# Patient Record
Sex: FEMALE | Race: WHITE | NOT HISPANIC OR LATINO
[De-identification: names, ages, dates, MRNs, and addresses within clinical notes are randomized per-mention and may not be internally consistent; named-entity substitution may affect disease eponyms.]

---

## 2023-05-26 PROBLEM — Z00.00 ENCOUNTER FOR PREVENTIVE HEALTH EXAMINATION: Status: ACTIVE | Noted: 2023-05-26

## 2023-06-30 ENCOUNTER — NON-APPOINTMENT (OUTPATIENT)
Age: 48
End: 2023-06-30

## 2023-07-06 ENCOUNTER — NON-APPOINTMENT (OUTPATIENT)
Age: 48
End: 2023-07-06

## 2023-07-07 ENCOUNTER — APPOINTMENT (OUTPATIENT)
Dept: GYNECOLOGIC ONCOLOGY | Facility: CLINIC | Age: 48
End: 2023-07-07
Payer: COMMERCIAL

## 2023-07-07 VITALS
HEART RATE: 77 BPM | DIASTOLIC BLOOD PRESSURE: 99 MMHG | RESPIRATION RATE: 18 BRPM | SYSTOLIC BLOOD PRESSURE: 129 MMHG | BODY MASS INDEX: 17.9 KG/M2 | WEIGHT: 125 LBS | HEIGHT: 70 IN | OXYGEN SATURATION: 99 %

## 2023-07-07 PROCEDURE — 99205 OFFICE O/P NEW HI 60 MIN: CPT

## 2023-07-07 NOTE — LETTER BODY
[Dear  ___] : Dear  [unfilled], [FreeTextEntry2] : \par Thank you for referring HUMPHREY PORTER to Gynecologic Oncology [facility]. I had the pleasure of meeting with Ms. HUMPHREY PORTER on 7/7/23. Please find my consultation note attached. Thank you for your trust and confidence in me and our practice, it means a great deal to us. Please feel free to contact me at anytime.\par Sincerely, \par \par Dr. Jeny Linares\par Office: [phone]\par Fax: [phone]\par

## 2023-07-07 NOTE — DISCUSSION/SUMMARY
[Reviewed Clinical Lab Test(s)] : Results of clinical tests were reviewed. [Discuss Alternatives/Risks/Benefits w/Patient] : All alternatives, risks, and benefits were discussed with the patient/family and all questions were answered.  Patient expressed good understanding and appreciates the importance of follow up as recommended. [Visit Time ___ Minutes] : [unfilled] minutes [Face to Face Time___ Minutes] : with [unfilled] minutes in face to face consultation. [FreeTextEntry1] : 47yo w/ long term persistent HPV infection and cervical dysplasia, recent pap with HPV16+ and colposcopy with CIN3, for excisional procedure.\par -I reviewed natural hx of HPV related cervical dysplasia and reviewed role of pap smear and colposcopy for surveillance of pre-cancer and cervix cancer. I reviewed CIN3 on colposcopy and next best step is excisional procedure to treat the dysplasia and rule out microinvasive cancer. Patient interested in definitive hysterectomy and wonders if she can just proceed with this and forego the cone biopsy.  Explained to her that given her long history of persistent dysplasia and recurrence of LAUREN-3 and HPV16+, it is possible that she may have a higher than 5% chance of invasive disease at the cervix and depending on extent of invasive disease simple hysterectomy may not suffice and she may need radical hysterectomy with lymph node dissection.  Therefore cone biopsy will provide all necessary information to determine the most appropriate neck step for definitive management. all questions answered and patient expressed understanding\par -Reviewed role of HPV vaccine at this juncture and given patient's persistent HPV infections and cervical dysplasia she is a candidate even though she is slightly over the age limit.  I explained to patient the benefit of post excision procedure vaccination and decreased risk of recurrent dysplasia when this protocol is followed.  I encouraged her  who was present to also get vaccinated and he is 44 years old, and I also encouraged them to vaccinate their children between age 9-11. all questions answered and patient expressed understanding\par -Surgical booking: EUA/CKC/ECC\par -ERAS, pre-op clearance at PST, labs, covid testing as pre protocol\par -risk, benefits reviewed with patient regarding above described procedure.  Reviewed risks of procedure not limited to infection, bleeding, injury to surrounding structures, VTE, heart and lung complications. all questions answered and patient expressed understanding\par -f/u post-op and plan HPV vaccine series at post-op visit\par

## 2023-07-07 NOTE — HISTORY OF PRESENT ILLNESS
[FreeTextEntry1] :  49yo   LMP , periods are now irregular aprox Q2 mths, referred for long history of abnormal paps and HPV 16+. Most recent annual exam with Dr erickson showed ASCUS pap and HPV 16+, colpo done and Bx showed CIN3. denies n/v/fever/bleeding/bloating. Reports normal urination and BMs. Pt has not received HPV vaccine\par \par PMHx: HTN\par PSHx: laparoscopy rsxn of endometriosis, LEEP, endoscopy\par OBGYNHx:  x2 hx of fibroids/cysts/endometriosis/abn paps/stis\par FamHx: maternal/paternal grandmothers with breast ca, denies gyn ca, colon ca\par SocHx: ETOH, marijuana use socially, denies cigarette smoking\par \par mammogram: WNL done within last year\par colonoscopy: WNL done \par \par Labs:\par Biopsy 23: \par 1.  Cervix, 6:00 biopsy: Unremarkable squamous mucosa\par 2.  Cervix, 9:00 biopsy: Cervical intraepithelial neoplasia (LAUREN) 3\par 3.  Cervix, 12:00 biopsy: Unremarkable squamous mucosa\par 4.  Endocervix, curettage: Unremarkable transformation zone epithelium\par \par pap 23: ASCUS, HPV 16+\par pap 11/10/23:  NILM HPV 16+\par LEEP 21: CIN3, ECC benign\par pap 5/3/23: ASCUS, HPV 16+\par pap 20: ASCUS, HPV 16+\par \par \par

## 2023-07-31 ENCOUNTER — APPOINTMENT (OUTPATIENT)
Dept: GYNECOLOGIC ONCOLOGY | Facility: CLINIC | Age: 48
End: 2023-07-31

## 2023-07-31 ENCOUNTER — RESULT REVIEW (OUTPATIENT)
Age: 48
End: 2023-07-31

## 2023-07-31 ENCOUNTER — TRANSCRIPTION ENCOUNTER (OUTPATIENT)
Age: 48
End: 2023-07-31

## 2023-08-02 ENCOUNTER — TRANSCRIPTION ENCOUNTER (OUTPATIENT)
Age: 48
End: 2023-08-02

## 2023-08-08 ENCOUNTER — APPOINTMENT (OUTPATIENT)
Dept: GYNECOLOGIC ONCOLOGY | Facility: CLINIC | Age: 48
End: 2023-08-08
Payer: COMMERCIAL

## 2023-08-08 VITALS
BODY MASS INDEX: 17.9 KG/M2 | SYSTOLIC BLOOD PRESSURE: 135 MMHG | HEIGHT: 70 IN | HEART RATE: 79 BPM | DIASTOLIC BLOOD PRESSURE: 77 MMHG | WEIGHT: 125 LBS

## 2023-08-08 PROCEDURE — 99213 OFFICE O/P EST LOW 20 MIN: CPT | Mod: 24

## 2023-08-08 NOTE — DISCUSSION/SUMMARY
[Reviewed Clinical Lab Test(s)] : Results of clinical tests were reviewed. [Discuss Alternatives/Risks/Benefits w/Patient] : All alternatives, risks, and benefits were discussed with the patient/family and all questions were answered.  Patient expressed good understanding and appreciates the importance of follow up as recommended. [Visit Time ___ Minutes] : [unfilled] minutes [Face to Face Time___ Minutes] : with [unfilled] minutes in face to face consultation. [FreeTextEntry1] : 47yo w/ CIN3 , HPV16+, s/p ckc/ecc, final path with LAUREN 1, neg margins, neg ECC. Now with post-op bleeding likely menstrual and small post-op bleed. NO active bleeding on exam today. For continued monitoring. -more than 50% of visit spent face to face with patient counseling and coordinating care -reviewed exam findings and only small blood on exam and suggestive of menstrual blood. monsels applied and surgicel left in place. bleeding precautions given. pt on strict pelvic rest -reviewed final path results and no further intervention needed other than continued pap smears and still recommend HPV 9 vaccine given hx of persistent HPV infection and patient just slightly over age limit. will coordinate vaccine inj series appointments. -rtc Friday as scheduled -pain/fever/bleeding precautions given

## 2023-08-08 NOTE — HISTORY OF PRESENT ILLNESS
[FreeTextEntry1] :  47yo with CIN3/HPV 16+ now s/p EUA/CKC/ECC on 7/31/23 now presents with c/o VB and passing large clot this morning. she states bleeding started about 4-5 days ago and has been intermittently heavy. she reports she is lizzy-menopausal but last menstrual flow was beginning of July. She admits she probably has been doing a little too much work around the house and is worried that caused the bleeding. she denies intercourse anything in vagina. she reports mild cramping pain. She denies fever/bloating. She has normal activity tolerance and normal appetite. Reports normal urination and BMs.   Pathology:  FINAL PATHOLOGIC DIAGNOSIS A. CERVIX, CONE, BIOPSY: - Koilocytic atypia consistent with HPV effect (LAUREN-I; LGSIL) in a background of chronic active cervicitis and reactive/metaplastic changes. - Negative for High grade dysplasia in the specimen examined (entire specimen and multiple levels examined). - See comment B. ENDOCERVIX CURETTINGS: - Polypoid fragments of endocervical mucosa; negative for dysplasia. - See comment Jennifer Sanchez 08/02/23 - [facility] Diagnosis Comment There has been intradepartmental review of this case with agreement. P16 immunohistochemical stain performed on block A3, A4 and B1 and showed focal patchy staining only.

## 2023-08-11 ENCOUNTER — APPOINTMENT (OUTPATIENT)
Dept: GYNECOLOGIC ONCOLOGY | Facility: CLINIC | Age: 48
End: 2023-08-11
Payer: COMMERCIAL

## 2023-08-11 VITALS — HEART RATE: 74 BPM | OXYGEN SATURATION: 97 % | DIASTOLIC BLOOD PRESSURE: 85 MMHG | SYSTOLIC BLOOD PRESSURE: 167 MMHG

## 2023-08-11 DIAGNOSIS — F41.1 GENERALIZED ANXIETY DISORDER: ICD-10-CM

## 2023-08-11 PROCEDURE — 99213 OFFICE O/P EST LOW 20 MIN: CPT | Mod: 24

## 2023-08-11 NOTE — DISCUSSION/SUMMARY
[Reviewed Clinical Lab Test(s)] : Results of clinical tests were reviewed. [Discuss Alternatives/Risks/Benefits w/Patient] : All alternatives, risks, and benefits were discussed with the patient/family and all questions were answered.  Patient expressed good understanding and appreciates the importance of follow up as recommended. [Visit Time ___ Minutes] : [unfilled] minutes [Face to Face Time___ Minutes] : with [unfilled] minutes in face to face consultation. [FreeTextEntry1] : 47yo w/ HRHPV+, CIN3 s/p ckc/ecc. Final path with CIN1. Post-op course c/b bleeding. Now improved after application of hemostatic agents. Will continue to monitor. -more than 50% of visit spent face to face with patient counseling and coordinating care -reviewed exam findings and reassured pt that bleeding should be resolved or much less. pt to continue pelvic rest and light activity for next 2wks -rx for xanax sent for anxiety as needed -pt to continue tylenol and motrin q6hrs for pain for the next 24-48hrs -if persistent pain then will order pelvic sono to rule out hematometra -f/u 2wks with AMERICA Carey for rpt exam -pain/fever/bleeding precautions given

## 2023-08-11 NOTE — HISTORY OF PRESENT ILLNESS
[FreeTextEntry1] : 47yo with CIN3/HPV 16+ now s/p EUA/CKC/ECC on 7/31/23 now presents for follow up and post op visit after being seen for c/o VB and passing large clot on Wednesday.  Since last visit patient reports continued heavy bleeding with bright red blood especially notices heavy flow when she goes to the bathroom. She also has intermittent pelvic cramping. She denies fever/bleeding/bloating. She has normal activity tolerance and continues pelvic rest and light activity. she has normal appetite. Reports normal urination and BMs. She has increased anxiety given post-op issues and current martial turmoil. she requests rx of xanax which she used to get from her PCP as needed for anxiety.

## 2023-08-15 ENCOUNTER — TRANSCRIPTION ENCOUNTER (OUTPATIENT)
Age: 48
End: 2023-08-15

## 2023-08-18 ENCOUNTER — NON-APPOINTMENT (OUTPATIENT)
Age: 48
End: 2023-08-18

## 2023-08-19 ENCOUNTER — NON-APPOINTMENT (OUTPATIENT)
Age: 48
End: 2023-08-19

## 2023-08-20 ENCOUNTER — NON-APPOINTMENT (OUTPATIENT)
Age: 48
End: 2023-08-20

## 2023-08-20 RX ORDER — TRAMADOL HYDROCHLORIDE 50 MG/1
50 TABLET, COATED ORAL EVERY 6 HOURS
Qty: 20 | Refills: 0 | Status: ACTIVE | COMMUNITY
Start: 2023-08-20 | End: 1900-01-01

## 2023-08-21 ENCOUNTER — RESULT REVIEW (OUTPATIENT)
Age: 48
End: 2023-08-21

## 2023-08-21 ENCOUNTER — APPOINTMENT (OUTPATIENT)
Dept: HEMATOLOGY ONCOLOGY | Facility: CLINIC | Age: 48
End: 2023-08-21
Payer: COMMERCIAL

## 2023-08-21 ENCOUNTER — INPATIENT (INPATIENT)
Facility: HOSPITAL | Age: 48
LOS: 1 days | Discharge: HOME | DRG: 921 | End: 2023-08-23
Attending: OBSTETRICS & GYNECOLOGY | Admitting: OBSTETRICS & GYNECOLOGY
Payer: COMMERCIAL

## 2023-08-21 ENCOUNTER — APPOINTMENT (OUTPATIENT)
Dept: GYNECOLOGIC ONCOLOGY | Facility: CLINIC | Age: 48
End: 2023-08-21
Payer: COMMERCIAL

## 2023-08-21 VITALS
OXYGEN SATURATION: 98 % | DIASTOLIC BLOOD PRESSURE: 87 MMHG | BODY MASS INDEX: 17.81 KG/M2 | HEART RATE: 87 BPM | TEMPERATURE: 98.4 F | HEIGHT: 70 IN | RESPIRATION RATE: 16 BRPM | WEIGHT: 124.38 LBS | SYSTOLIC BLOOD PRESSURE: 128 MMHG

## 2023-08-21 VITALS
DIASTOLIC BLOOD PRESSURE: 92 MMHG | OXYGEN SATURATION: 96 % | WEIGHT: 125 LBS | SYSTOLIC BLOOD PRESSURE: 154 MMHG | HEART RATE: 60 BPM | TEMPERATURE: 99 F | RESPIRATION RATE: 17 BRPM | HEIGHT: 70 IN

## 2023-08-21 VITALS — DIASTOLIC BLOOD PRESSURE: 87 MMHG | HEART RATE: 86 BPM | SYSTOLIC BLOOD PRESSURE: 126 MMHG
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
## 2023-08-21 DIAGNOSIS — N99.820 POSTPROCEDURAL HEMORRHAGE OF A GENITOURINARY SYSTEM ORGAN OR STRUCTURE FOLLOWING A GENITOURINARY SYSTEM PROCEDURE: ICD-10-CM

## 2023-08-21 DIAGNOSIS — Z80.3 FAMILY HISTORY OF MALIGNANT NEOPLASM OF BREAST: ICD-10-CM

## 2023-08-21 DIAGNOSIS — D69.9 HEMORRHAGIC CONDITION, UNSPECIFIED: ICD-10-CM

## 2023-08-21 DIAGNOSIS — D06.9 CARCINOMA IN SITU OF CERVIX, UNSPECIFIED: ICD-10-CM

## 2023-08-21 DIAGNOSIS — I10 ESSENTIAL (PRIMARY) HYPERTENSION: ICD-10-CM

## 2023-08-21 DIAGNOSIS — Z86.79 PERSONAL HISTORY OF OTHER DISEASES OF THE CIRCULATORY SYSTEM: ICD-10-CM

## 2023-08-21 DIAGNOSIS — F41.9 ANXIETY DISORDER, UNSPECIFIED: ICD-10-CM
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
## 2023-08-21 DIAGNOSIS — J45.909 UNSPECIFIED ASTHMA, UNCOMPLICATED: ICD-10-CM

## 2023-08-21 PROCEDURE — 99205 OFFICE O/P NEW HI 60 MIN: CPT | Mod: 25

## 2023-08-21 PROCEDURE — 99214 OFFICE O/P EST MOD 30 MIN: CPT

## 2023-08-21 PROCEDURE — 36415 COLL VENOUS BLD VENIPUNCTURE: CPT

## 2023-08-21 RX ORDER — HYDROMORPHONE HYDROCHLORIDE 2 MG/ML
0.5 INJECTION INTRAMUSCULAR; INTRAVENOUS; SUBCUTANEOUS ONCE
Refills: 0 | Status: DISCONTINUED | OUTPATIENT
Start: 2023-08-21 | End: 2023-08-21

## 2023-08-21 RX ORDER — TRAMADOL HYDROCHLORIDE 50 MG/1
50 TABLET, COATED ORAL EVERY 6 HOURS
Qty: 10 | Refills: 0 | Status: COMPLETED | COMMUNITY
Start: 2023-08-08 | End: 2023-08-21

## 2023-08-21 RX ORDER — ESCITALOPRAM OXALATE 20 MG/1
20 TABLET, FILM COATED ORAL
Refills: 0 | Status: ACTIVE | COMMUNITY

## 2023-08-21 RX ORDER — LISINOPRIL 10 MG/1
10 TABLET ORAL DAILY
Refills: 0 | Status: ACTIVE | COMMUNITY

## 2023-08-21 RX ADMIN — HYDROMORPHONE HYDROCHLORIDE 0.5 MILLIGRAM(S): 2 INJECTION INTRAMUSCULAR; INTRAVENOUS; SUBCUTANEOUS at 23:58

## 2023-08-21 RX ADMIN — HYDROMORPHONE HYDROCHLORIDE 0.5 MILLIGRAM(S): 2 INJECTION INTRAMUSCULAR; INTRAVENOUS; SUBCUTANEOUS at 23:43

## 2023-08-21 NOTE — HISTORY OF PRESENT ILLNESS
[de-identified] : Pt states her gyn, Dr. Dulce Maria Leal, referred her to Dr. Santy Delong, where comb biopsy performed  and bleeding still continues. States MD Santy Delong performed procedure to stop the bleeding 8/15/23 at [facility] and that did not work. 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 Has seen multiple mds and have had multiple tests and results are questionable.  States when having her last child in 2014, she hemorrhaged and almost needed a blood transfusion. States had several leep procedures performed with the same result of increased bleeding.  Menarche: 15 years old Menopause: perimenopause started within the last year G5,P2 (2 miscarriages and 1 ) Age at first live birth: 39 years old Nursed: formula fed Oral Contraceptives: none Hormone Replacement Therapy: none Last pap/pelvic exam: 2023 Related family history:  Paternal grandmother: breast cancer,  at age 79 Maternal grandmother: breast cancer,  at age 2022 Occupation:  Genetic testing: unsure Smoking: never ETOH: 2-3 times a week  Colonoscopy: last performed ; states test were normal Endoscopy: last performed ; states test show gastritis Mammo/US: last performed ; states test was normal Gyn: last visited 2023  DVTs: no hx Anticoagulant Hx: no hx Sleep Apnea: no hx  Recent Hospitalizations: 23 due to bleeding and was in the ER at [facility] Vaccinations: up to date

## 2023-08-21 NOTE — REASON FOR VISIT
After Visit Summary   10/30/2017    Lou Rivers    MRN: [id number]           Patient Information     Date Of Birth          1983        Visit Information        Provider Department      10/30/2017 1:00 PM Wen Laurent DO [facility]        Care Instructions                                                         If you have any questions regarding your visit, Please contact your care team.    Women s Health CLINIC HOURS TELEPHONE NUMBER   Wen DO Anastasiia.    AVELINA Hoover -    KENNETH Ngo RN       Monday, Wednesday, Thursday and [facility]  8:30a.m-5:00 p.m   [facility]  [address] [place] [address] [phone] ask for [facility]    Imaging [phone]       Urgent Care locations:    [facility] Saturday and Sunday   9 am - 5 pm    Monday-Friday   12 pm - 8 pm  Saturday and Sunday   9 am - 5 pm   [phone]     [facility] Labor and Delivery:  [phone]    If you need a medication refill, please contact your pharmacy. Please allow 3 business days for your refill to be completed.  As always, Thank you for trusting us with your healthcare needs!                Follow-ups after your visit        Your next 10 appointments already scheduled     Dec 01, 2017  4:30 PM CST   ESTABLISHED PRENATAL with Wen Laurent DO   [facility] ([facility])    [address]  [facility] [address] [phone]              Who to contact     If you have questions or need follow up information about today's clinic visit or your schedule please contact [facility] directly at [phone].  Normal or non-critical lab and imaging results will be communicated to you by MyChart, letter or phone within 4 business days after the clinic has received the results. If you do not hear  "from us within 7 days, please contact the clinic through [employer] or phone. If you have a critical or abnormal lab result, we will notify you by phone as soon as possible.  Submit refill requests through [employer] or call your pharmacy and they will forward the refill request to us. Please allow 3 business days for your refill to be completed.          Additional Information About Your Visit        [employer] Information     [employer] lets you send messages to your doctor, view your test results, renew your prescriptions, schedule appointments and more. To sign up, go to [email or web address] . Click on \"Log in\" on the left side of the screen, which will take you to the Welcome page. Then click on \"Sign up Now\" on the right side of the page.     You will be asked to enter the access code listed below, as well as some personal information. Please follow the directions to create your username and password.     Your access code is: [email or web address]  Expires: 10/31/2017 11:38 AM     Your access code will  in 90 days. If you need help or a new code, please call your [facility] clinic or [phone].        Care EveryWhere ID     This is your Care EveryWhere ID. This could be used by other organizations to access your [facility] medical records  [id number]        Your Vitals Were     Pulse Height Last Period Pulse Oximetry Breastfeeding? BMI (Body Mass Index)    101 1.588 m (5' 2.5\") 2017 97% No 25.85 kg/m2       Blood Pressure from Last 3 Encounters:   10/30/17 122/76   10/27/17 122/60   10/06/17 118/80    Weight from Last 3 Encounters:   10/30/17 65.1 kg (143 lb 9.6 oz)   10/27/17 64.8 kg (142 lb 14.4 oz)   17 63.6 kg (140 lb 3.2 oz)              Today, you had the following     No orders found for display       Primary Care Provider Office Phone # Fax #    [facility] [phone] [address]  [facility] [address]        Equal Access to Services     ALFONSO SHERMAN AH: " Hadii wiliam Gee, waedgardda luqadaha, qaybta kaaldong jama, nicole leni aliafatuma ochoacourtney nickjuancarlos lasammfatuma meg. So [facility] [phone].    ATENCIÓN: Si giovanala adriana, tiene a dahl disposición servicios gratuitos de asistencia lingüística. Llame al [phone].    We comply with applicable federal civil rights laws and [place] laws. We do not discriminate on the basis of race, color, national origin, age, disability, sex, sexual orientation, or gender identity.            Thank you!     Thank you for choosing [facility]  for your care. Our goal is always to provide you with excellent care. Hearing back from our patients is one way we can continue to improve our services. Please take a few minutes to complete the written survey that you may receive in the mail after your visit with us. Thank you!             Your Updated Medication List - Protect others around you: Learn how to safely use, store and throw away your medicines at www.disposemymeds.org.          This list is accurate as of: 10/30/17  1:16 PM.  Always use your most recent med list.                   Brand Name Dispense Instructions for use Diagnosis    fluticasone 50 MCG/ACT spray    FLONASE    1 Bottle    Spray 1 spray into both nostrils 2 times daily    Chronic allergic rhinitis due to pollen, unspecified seasonality       prenatal multivitamin plus iron 27-0.8 MG Tabs per tablet     100 tablet    Take 1 tablet by mouth daily    Family planning counseling          [FreeTextEntry1] : follow up

## 2023-08-21 NOTE — REVIEW OF SYSTEMS
[Night Sweats] : night sweats [Palpitations] : palpitations [Abdominal Pain] : abdominal pain [Dysmenorrhea/Abn Vaginal Bleeding] : dysmenorrhea/abnormal vaginal bleeding [Insomnia] : insomnia [Hot Flashes] : hot flashes [Easy Bleeding] : a tendency for easy bleeding [Easy Bruising] : a tendency for easy bruising [Negative] : Neurological [FreeTextEntry2] : several times a week [FreeTextEntry5] : several times a week either at rest or on exertion [FreeTextEntry7] : since surgery in July; takes tramadol and advil [FreeTextEntry8] : states hematuria since surgery in July

## 2023-08-21 NOTE — ASSESSMENT
[FreeTextEntry1] : Excessive bleeding Had cone biopsy 7/31/23 for CIN3 ongoing bleeding since. Occasionally passes clots. Reports it is getting worse Had LEEP in 2020 and had excess bleeding x 2 weeks.  No prior blood transfusion but was close to getting one during 1st pregnancy which was vaginal Hamstring surgery - no bleeding at that time Colposcopy, colonoscopy- no bleeding Played soccer, volleyball, basketball, tennis, track- would bleed very easily Never had heavy menses.  No nose bleeds, gum bleeds No family h/o bleeding bruising Drinks alcohol daily-> moreso lately due to her social issues Had seen hematologist in the past after her 2nd miscarriage (both in first trimester), also has 2 FTDs. 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  Referred to genetics.  She wants to hold off for now  Preop clearance Needs hysterectomy Clearance pending above blood work  Patient had multiple questions which were answered to satisfaction  Follow up in a couple of weeks No labs

## 2023-08-21 NOTE — PATIENT PROFILE ADULT - FALL HARM RISK - HARM RISK INTERVENTIONS

## 2023-08-21 NOTE — HISTORY OF PRESENT ILLNESS
[FreeTextEntry1] : 49yo with CIN3/HPV 16+ now s/p EUA/CKC/ECC on 7/31/23, returned to OR for cauterization of cervix on 8/15/23 now presents for follow up due to persistent bleeding over the weekend.  Since last visit patient reports having intermittent cramping and vaginal bleeding. States she stains pads and notices that bleeding will be light for several hours and then becomes heavy again. Passage of small clots but mostly bright red blood. She denies dizziness/sob/cp. Was seen by Dr Ibarra this morning for work up of bleeding disorder, initial labs (BC, PT/INR, PTT WNL). She denies fever/bloating. She has normal activity tolerance and normal appetite. Reports normal urination and BMs.

## 2023-08-21 NOTE — DISCUSSION/SUMMARY
[Reviewed Clinical Lab Test(s)] : Results of clinical tests were reviewed. [Discuss Tests w/Referring Providers] : Results of labs/radiology studies and the treatment recommendations were discussed with performing/referring physician. [Visit Time ___ Minutes] : [unfilled] minutes [FreeTextEntry1] : 49yo with CIN3/HPV 16+ now s/p EUA/CKC/ECC on 7/31/23, returned to OR for cauterization of cervix on 8/15/23 now presents for follow up due to persistent bleeding, speculum exam shows areas of bleeding at 12:00 and 7:00, unable to achieve consistent hemostasis with medications alone(nitrazine/Monsel's), so vagina packed with 4x4 gauze x2 tied together soaked in Monsel's and pt transported to ER for further management and IR consult for embolization. VSS and H/H wnl from this morning.  -more than 50% of visit spent face to face with patient counseling and coordinating care  -reviewed exam findings and advised pt that bleeding should be much less in order to be sent home and although she is stable at this time with VSS and stable H/H she will continue to bleed at home and needs more definitive management -transport to ER for further management with vaginal packing in situ and IR consult for embolization -will reach out to Dr Leal/Chapin for GYN coverage -f/u remaining labs for hematology workup D/W Dr Linares

## 2023-08-22 LAB
APPEARANCE UR: ABNORMAL
APTT BLD: 33.6 SEC — SIGNIFICANT CHANGE UP (ref 24.5–35.6)
BACTERIA # UR AUTO: PRESENT /HPF
BASOPHILS # BLD AUTO: 0.05 K/UL — SIGNIFICANT CHANGE UP (ref 0–0.2)
BASOPHILS NFR BLD AUTO: 0.5 % — SIGNIFICANT CHANGE UP (ref 0–2)
BILIRUB UR-MCNC: NEGATIVE — SIGNIFICANT CHANGE UP
BLD GP AB SCN SERPL QL: NEGATIVE — SIGNIFICANT CHANGE UP
COLOR SPEC: YELLOW — SIGNIFICANT CHANGE UP
COMMENT - URINE: SIGNIFICANT CHANGE UP
DIFF PNL FLD: ABNORMAL
EOSINOPHIL # BLD AUTO: 0.33 K/UL — SIGNIFICANT CHANGE UP (ref 0–0.5)
EOSINOPHIL NFR BLD AUTO: 3.5 % — SIGNIFICANT CHANGE UP (ref 0–6)
EPI CELLS # UR: ABNORMAL /HPF (ref 0–5)
FACT IX PPP CHRO-ACNC: 92 % — SIGNIFICANT CHANGE UP (ref 69–167)
FACT VIII ACT/NOR PPP: 95 % — SIGNIFICANT CHANGE UP (ref 51–138)
FIBRINOGEN PPP-MCNC: 280 MG/DL — SIGNIFICANT CHANGE UP (ref 200–445)
GLUCOSE UR QL: NEGATIVE — SIGNIFICANT CHANGE UP
HCG UR QL: NEGATIVE — SIGNIFICANT CHANGE UP
HCT VFR BLD CALC: 40.5 % — SIGNIFICANT CHANGE UP (ref 34.5–45)
HGB BLD-MCNC: 13.6 G/DL — SIGNIFICANT CHANGE UP (ref 11.5–15.5)
IMM GRANULOCYTES NFR BLD AUTO: 0.2 % — SIGNIFICANT CHANGE UP (ref 0–0.9)
INR BLD: 0.89 — SIGNIFICANT CHANGE UP (ref 0.85–1.18)
KETONES UR-MCNC: NEGATIVE — SIGNIFICANT CHANGE UP
LEUKOCYTE ESTERASE UR-ACNC: ABNORMAL
LYMPHOCYTES # BLD AUTO: 2.61 K/UL — SIGNIFICANT CHANGE UP (ref 1–3.3)
LYMPHOCYTES # BLD AUTO: 27.8 % — SIGNIFICANT CHANGE UP (ref 13–44)
MCHC RBC-ENTMCNC: 31.6 PG — SIGNIFICANT CHANGE UP (ref 27–34)
MCHC RBC-ENTMCNC: 33.6 GM/DL — SIGNIFICANT CHANGE UP (ref 32–36)
MCV RBC AUTO: 94.2 FL — SIGNIFICANT CHANGE UP (ref 80–100)
MONOCYTES # BLD AUTO: 0.67 K/UL — SIGNIFICANT CHANGE UP (ref 0–0.9)
MONOCYTES NFR BLD AUTO: 7.1 % — SIGNIFICANT CHANGE UP (ref 2–14)
NEUTROPHILS # BLD AUTO: 5.71 K/UL — SIGNIFICANT CHANGE UP (ref 1.8–7.4)
NEUTROPHILS NFR BLD AUTO: 60.9 % — SIGNIFICANT CHANGE UP (ref 43–77)
NITRITE UR-MCNC: NEGATIVE — SIGNIFICANT CHANGE UP
NRBC # BLD: 0 /100 WBCS — SIGNIFICANT CHANGE UP (ref 0–0)
PH UR: 7.5 — SIGNIFICANT CHANGE UP (ref 5–8)
PLATELET # BLD AUTO: 276 K/UL — SIGNIFICANT CHANGE UP (ref 150–400)
PROT UR-MCNC: 30 MG/DL
PROTHROM AB SERPL-ACNC: 10.2 SEC — SIGNIFICANT CHANGE UP (ref 9.5–13)
RBC # BLD: 3.96 M/UL — SIGNIFICANT CHANGE UP (ref 3.8–5.2)
RBC # BLD: 4.3 M/UL — SIGNIFICANT CHANGE UP (ref 3.8–5.2)
RBC # FLD: 13.4 % — SIGNIFICANT CHANGE UP (ref 10.3–14.5)
RBC CASTS # UR COMP ASSIST: ABNORMAL /HPF
RETICS #: 63.8 K/UL — SIGNIFICANT CHANGE UP (ref 25–125)
RETICS/RBC NFR: 1.6 % — SIGNIFICANT CHANGE UP (ref 0.5–2.5)
RH IG SCN BLD-IMP: POSITIVE — SIGNIFICANT CHANGE UP
RH IG SCN BLD-IMP: POSITIVE — SIGNIFICANT CHANGE UP
SP GR SPEC: 1.01 — SIGNIFICANT CHANGE UP (ref 1–1.03)
UROBILINOGEN FLD QL: 0.2 E.U./DL — SIGNIFICANT CHANGE UP
WBC # BLD: 9.39 K/UL — SIGNIFICANT CHANGE UP (ref 3.8–10.5)
WBC # FLD AUTO: 9.39 K/UL — SIGNIFICANT CHANGE UP (ref 3.8–10.5)
WBC UR QL: ABNORMAL /HPF

## 2023-08-22 PROCEDURE — 99221 1ST HOSP IP/OBS SF/LOW 40: CPT

## 2023-08-22 RX ORDER — OXYCODONE HYDROCHLORIDE 5 MG/1
5 TABLET ORAL EVERY 6 HOURS
Refills: 0 | Status: DISCONTINUED | OUTPATIENT
Start: 2023-08-22 | End: 2023-08-23

## 2023-08-22 RX ORDER — ALBUTEROL 90 UG/1
2 AEROSOL, METERED ORAL EVERY 6 HOURS
Refills: 0 | Status: DISCONTINUED | OUTPATIENT
Start: 2023-08-22 | End: 2023-08-23

## 2023-08-22 RX ORDER — PHENAZOPYRIDINE HCL 100 MG
200 TABLET ORAL EVERY 8 HOURS
Refills: 0 | Status: DISCONTINUED | OUTPATIENT
Start: 2023-08-22 | End: 2023-08-22

## 2023-08-22 RX ORDER — LISINOPRIL 2.5 MG/1
10 TABLET ORAL DAILY
Refills: 0 | Status: DISCONTINUED | OUTPATIENT
Start: 2023-08-22 | End: 2023-08-23

## 2023-08-22 RX ORDER — PHENAZOPYRIDINE HCL 100 MG
200 TABLET ORAL EVERY 8 HOURS
Refills: 0 | Status: DISCONTINUED | OUTPATIENT
Start: 2023-08-22 | End: 2023-08-23

## 2023-08-22 RX ORDER — ONDANSETRON 8 MG/1
8 TABLET, FILM COATED ORAL EVERY 8 HOURS
Refills: 0 | Status: DISCONTINUED | OUTPATIENT
Start: 2023-08-22 | End: 2023-08-23

## 2023-08-22 RX ORDER — OXYCODONE HYDROCHLORIDE 5 MG/1
5 TABLET ORAL ONCE
Refills: 0 | Status: DISCONTINUED | OUTPATIENT
Start: 2023-08-22 | End: 2023-08-22

## 2023-08-22 RX ORDER — ZOLPIDEM TARTRATE 10 MG/1
5 TABLET ORAL AT BEDTIME
Refills: 0 | Status: DISCONTINUED | OUTPATIENT
Start: 2023-08-22 | End: 2023-08-23

## 2023-08-22 RX ORDER — HYDROMORPHONE HYDROCHLORIDE 2 MG/ML
0.5 INJECTION INTRAMUSCULAR; INTRAVENOUS; SUBCUTANEOUS ONCE
Refills: 0 | Status: DISCONTINUED | OUTPATIENT
Start: 2023-08-22 | End: 2023-08-22

## 2023-08-22 RX ORDER — SODIUM CHLORIDE 9 MG/ML
1000 INJECTION, SOLUTION INTRAVENOUS
Refills: 0 | Status: DISCONTINUED | OUTPATIENT
Start: 2023-08-22 | End: 2023-08-22

## 2023-08-22 RX ORDER — ONDANSETRON 8 MG/1
10 TABLET, FILM COATED ORAL EVERY 6 HOURS
Refills: 0 | Status: DISCONTINUED | OUTPATIENT
Start: 2023-08-22 | End: 2023-08-22

## 2023-08-22 RX ORDER — LISINOPRIL 2.5 MG/1
10 TABLET ORAL EVERY 24 HOURS
Refills: 0 | Status: DISCONTINUED | OUTPATIENT
Start: 2023-08-22 | End: 2023-08-22

## 2023-08-22 RX ORDER — ACETAMINOPHEN 500 MG
1000 TABLET ORAL ONCE
Refills: 0 | Status: COMPLETED | OUTPATIENT
Start: 2023-08-22 | End: 2023-08-22

## 2023-08-22 RX ORDER — METOCLOPRAMIDE HCL 10 MG
10 TABLET ORAL EVERY 8 HOURS
Refills: 0 | Status: DISCONTINUED | OUTPATIENT
Start: 2023-08-22 | End: 2023-08-23

## 2023-08-22 RX ORDER — SODIUM CHLORIDE 9 MG/ML
1000 INJECTION, SOLUTION INTRAVENOUS
Refills: 0 | Status: DISCONTINUED | OUTPATIENT
Start: 2023-08-22 | End: 2023-08-23

## 2023-08-22 RX ORDER — TRANEXAMIC ACID 100 MG/ML
1000 INJECTION, SOLUTION INTRAVENOUS ONCE
Refills: 0 | Status: COMPLETED | OUTPATIENT
Start: 2023-08-22 | End: 2023-08-22

## 2023-08-22 RX ORDER — ACETAMINOPHEN 500 MG
1000 TABLET ORAL EVERY 6 HOURS
Refills: 0 | Status: DISCONTINUED | OUTPATIENT
Start: 2023-08-22 | End: 2023-08-23

## 2023-08-22 RX ORDER — ESCITALOPRAM OXALATE 10 MG/1
20 TABLET, FILM COATED ORAL DAILY
Refills: 0 | Status: DISCONTINUED | OUTPATIENT
Start: 2023-08-22 | End: 2023-08-23

## 2023-08-22 RX ORDER — METOCLOPRAMIDE HCL 10 MG
10 TABLET ORAL EVERY 6 HOURS
Refills: 0 | Status: DISCONTINUED | OUTPATIENT
Start: 2023-08-22 | End: 2023-08-22

## 2023-08-22 RX ADMIN — Medication 1000 MILLIGRAM(S): at 06:01

## 2023-08-22 RX ADMIN — HYDROMORPHONE HYDROCHLORIDE 0.5 MILLIGRAM(S): 2 INJECTION INTRAMUSCULAR; INTRAVENOUS; SUBCUTANEOUS at 02:22

## 2023-08-22 RX ADMIN — OXYCODONE HYDROCHLORIDE 5 MILLIGRAM(S): 5 TABLET ORAL at 14:22

## 2023-08-22 RX ADMIN — Medication 1000 MILLIGRAM(S): at 12:22

## 2023-08-22 RX ADMIN — Medication 200 MILLIGRAM(S): at 21:49

## 2023-08-22 RX ADMIN — LISINOPRIL 10 MILLIGRAM(S): 2.5 TABLET ORAL at 02:21

## 2023-08-22 RX ADMIN — Medication 400 MILLIGRAM(S): at 00:44

## 2023-08-22 RX ADMIN — LISINOPRIL 10 MILLIGRAM(S): 2.5 TABLET ORAL at 18:22

## 2023-08-22 RX ADMIN — TRANEXAMIC ACID 220 MILLIGRAM(S): 100 INJECTION, SOLUTION INTRAVENOUS at 13:15

## 2023-08-22 RX ADMIN — HYDROMORPHONE HYDROCHLORIDE 0.5 MILLIGRAM(S): 2 INJECTION INTRAMUSCULAR; INTRAVENOUS; SUBCUTANEOUS at 02:37

## 2023-08-22 RX ADMIN — Medication 1000 MILLIGRAM(S): at 23:01

## 2023-08-22 RX ADMIN — OXYCODONE HYDROCHLORIDE 5 MILLIGRAM(S): 5 TABLET ORAL at 13:22

## 2023-08-22 RX ADMIN — Medication 1000 MILLIGRAM(S): at 18:03

## 2023-08-22 RX ADMIN — ZOLPIDEM TARTRATE 5 MILLIGRAM(S): 10 TABLET ORAL at 23:01

## 2023-08-22 RX ADMIN — ESCITALOPRAM OXALATE 20 MILLIGRAM(S): 10 TABLET, FILM COATED ORAL at 12:22

## 2023-08-22 RX ADMIN — OXYCODONE HYDROCHLORIDE 5 MILLIGRAM(S): 5 TABLET ORAL at 04:59

## 2023-08-22 RX ADMIN — ONDANSETRON 8 MILLIGRAM(S): 8 TABLET, FILM COATED ORAL at 13:15

## 2023-08-22 RX ADMIN — SODIUM CHLORIDE 125 MILLILITER(S): 9 INJECTION, SOLUTION INTRAVENOUS at 00:44

## 2023-08-22 RX ADMIN — OXYCODONE HYDROCHLORIDE 5 MILLIGRAM(S): 5 TABLET ORAL at 05:59

## 2023-08-22 NOTE — CONSULT NOTE ADULT - SUBJECTIVE AND OBJECTIVE BOX
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 Patient reports prior to this procedure, she did not have any prior surgeries that required extensive blood transfusion. She reports after this procedure she had intermittent bleeding, prompting her to go to her OBGYN office on ,a nd required moncels and hemostatic agents. She reported that this was improved for a few days, though developed bleeding again on  and , and was taken back to OR for cauterization of cervix on 8/15.  Her last menstrual cycle she reports was in . Her prior bleeding after procedure was only about 1-2 per day and has been continuous until this weekend when it increased to a pad every hour. She was seen by her gyne onc Dr. Ibarra (hematologist)  and Dr. Delong and was sent to ED by gyn-onc PA. She underwent the start of a hematologic workup, though reports was unable to get some of her labwork. She was transferred to [facility] from [facility] ([facility] She reports the bleeding has since slowed down. She endorses fatigue and nausea, though denies lighteheadedness, shortness of breath, chest pain, vomiting, abdoiminal pain, or dysuria.     Of note, told once by a hematologist that she may have VWF deficiency in , when she was worked up after a miscarriage. She reports having 2 miscarriages both in the first trimester, and 2 normal spontaneous vaginal deliveries. Labs after postpartum hemorrhage revealed a VWF Factor 8 level low at 42, and fibrinogen low at 58. She reports she did have some bleeding 2014 after delivery of her last child and "almost required a transfusion." She also endorses some easy bruising, though no other epistaxis, rectal bleeding, or bleeding of any kind. She also reports several LEEP procedures performed with evidence of increased bleeding as well. Of note, patient reports that she has been driking more frequently ("at least 2 drinks per day")    Speculum exam done by OBGYN limited, but no overt signs of pooling or active bleeding. WBC 9.39, Hgb 13.6, Platelet 276    Seen by Dr. Brandon Ibarra outpatient ; Additional notes as follows    DVTs: no hx  Anticoagulant Hx: no hx  Sleep Apnea: no hx    Recent Hospitalizations: 23 due to bleeding and was in the ER at [facility]  Vaccinations: up to date    Menarche 15 years old  Menopause: perimenopause started within the last year   G2, P2 (2 miscarriages and 1 )  Age at first live birth: 39 years old   Nursed: formula fed  Oral Contraceptives: none   HRT: none   Last pap/pelvic exam   2023  Related family history   Paternal grandmother: breast cancer,  at age 79    SH     genetic testing: unsure   Smoking: never   ETOH: 2-3 times/week     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 Occasional passes of clots. Reports it is getting worse  Had LEEP in  and had excess bleeding x 2 weeks   No prior blood transfusion but was close to getting one during 1st pregnancy which was vaginal   Hamstring surgery - no bleeding at that time    Colposcopy, colonoscopy - no bleeding  Played soccer, volleyball, basketball, tennis, track- would bleed very easily   Never had heavy menses  No nose bleeds, gum bleeds  No familiy history of bleeding bruising   Drinks alcohol daily moreson lately due to her social issues    23 VWF activity 91  23 VWF multimer analysis normal  23 fibrinogen normal   Hgb 13.6 23 (has been 13-14 since July in our records, 1 value 2020 showed Hgb 13.7)  PT 10.2 (nml)  PTT 33.6 (nml)  INR 0.89  Rh positive   BUN normal   23  Iron Total, Serum 58  TIBC 366  Ferritin 90    PAST MEDICAL & SURGICAL HISTORY:      Allergies:  No Known Allergies      Medications:        Social History:    FAMILY HISTORY:      PHYSICAL EXAM:    T(F): 98 (23 @ 12:45), Max: 98.6 (23 @ 22:00)  HR: 74 (23 @ 12:45) (60 - 74)  BP: 152/84 (23 @ 12:45) (138/99 - 166/113)  RR: 19 (23 @ 12:45) (17 - 19)  SpO2: 97% (23 @ 12:45) (95% - 97%)  Wt(kg): --    Daily Height in cm: 177.8 (22 Aug 2023 02:54)    Daily Weight in k.7 (21 Aug 2023 22:00)    Gen: well developed, well nourished, comfortable  HEENT: normocephalic/atraumatic, no conjunctival pallor, no scleral icterus, no oral thrush/mucosal bleeding/mucositis  Neck: supple, no masses, no JVD  Breasts: deferred  Back: nontender  Cardiovascular: RR, nl S1S2, no murmurs/rubs/gallops  Respiratory: clear air entry b/l  Gastrointestinal: BS+, soft, NT/ND, no masses, no splenomegaly, no hepatomegaly, no evidence for ascites  Genitourinary: deferred  Rectal: deferred  Extremities: no clubbing/cyanosis, no edema, no calf tenderness  Vascular:  DP/PT 2+ b/l  Neurological: CN 2-12 grossly intact, no focal deficits  Skin: no rash on visible skin  Lymph Nodes:  no cervical/supraclavicular LAD, no axillary/groin LAD  Musculoskeletal:  full ROM  Psychiatric:  mood stable            Labs:                          13.6   9.39  )-----------( 276      ( 22 Aug 2023 00:39 )             40.5     CBC Full  -  ( 22 Aug 2023 06:29 )  WBC Count : x  RBC Count : 3.96 M/uL  Hemoglobin : x  Hematocrit : x  Platelet Count - Automated : x  Mean Cell Volume : x  Mean Cell Hemoglobin : x  Mean Cell Hemoglobin Concentration : x  Auto Neutrophil # : x  Auto Lymphocyte # : x  Auto Monocyte # : x  Auto Eosinophil # : x  Auto Basophil # : x  Auto Neutrophil % : x  Auto Lymphocyte % : x  Auto Monocyte % : x  Auto Eosinophil % : x  Auto Basophil % : x    PT/INR - ( 22 Aug 2023 00:39 )   PT: 10.2 sec;   INR: 0.89          PTT - ( 22 Aug 2023 00:39 )  PTT:33.6 sec                Other Labs:    Cultures:    Pathology:    Imaging Studies:       Hematology Oncology Consult Note     Sarah Beth Fulton is a 48 year old female who presents from OSH due to persistent vaginal bleeding after 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 Patient reports prior to this procedure, she did not have any prior surgeries that required extensive blood transfusion. She reports after this procedure she had intermittent bleeding, prompting her to go to her OBGYN office on , and required moncels and hemostatic agents. She reported that this was improved for a few days, though developed bleeding again on  and , and was taken back to OR for cauterization of cervix on 8/15.  She reports prior bleeding after procedure was only about 1-2 per day and has been continuous until this weekend when it increased to a pad every hour. She was seen by  Dr. Ibarra (hematologist)  and Dr. Delong (gyn onc) and was sent to ED by gyn-onc PA. She underwent the start of a hematologic workup in heme/onc outpatient office, though reports was unable to get some of her labwork. She presentd to OSH ([facility]), and transferred to [facility]. She reports the bleeding has since slowed down. She endorses fatigue and nausea, though denies lightheadedness, shortness of breath, chest pain, vomiting, abdominal pain, or dysuria.     Of note, patient was once told by a hematologist that she may have VWF deficiency in , when she was worked up after a miscarriage. She reports having 2 miscarriages both in the first trimester, and 2 normal spontaneous vaginal deliveries. Labs after postpartum hemorrhage revealed a VWF Factor 8 level low at 42 (was told lowerered by O blood type), and fibrinogen low at 58. She reports she did have some bleeding 2014 after delivery of her last child and "almost required a transfusion." She also endorses some easy bruising, though no other epistaxis, rectal bleeding, or bleeding of any kind. She also reports several LEEP procedures performed with evidence of increased bleeding as well. Of note, patient reports that she has been drinking more frequently ("at least 2 drinks per day")    Speculum exam done by OBGYN limited, but no overt signs of pooling or active bleeding. WBC 9.39, Hgb 13.6, Platelet 276    Seen by Dr. Brandon Ibarra outpatient ; Additional notes as follows    23 VWF activity 91  23 VWF multimer analysis normal  23 fibrinogen normal   Hgb 13.6 23 (has been 13-14 since July in our records, 1 value 2020 showed Hgb 13.7)  PT 10.2 (nml)  PTT 33.6 (nml)  INR 0.89  Rh positive   BUN normal   23  Iron Total, Serum 58  TIBC 366  Ferritin 90    DVTs: no hx  Anticoagulant Hx: no hx  Sleep Apnea: no hx    Recent Hospitalizations: 23 due to bleeding and was in the ER at [facility]  Vaccinations: up to date    Menarche 15 years old  Menopause: perimenopause started within the last year   G2, P2 (2 miscarriages and 1 )  Age at first live birth: 39 years old   Nursed: formula fed  Oral Contraceptives: none   HRT: none   Last pap/pelvic exam   2023  Related family history   Paternal grandmother: breast cancer,  at age 79    SH     genetic testing: unsure   Smoking: never   ETOH: 2-3 times/week     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 Occasional passes of clots. Reports it is getting worse  Had LEEP in  and had excess bleeding x 2 weeks   No prior blood transfusion but was close to getting one during 1st pregnancy which was vaginal   Hamstring surgery - no bleeding at that time    Colposcopy, colonoscopy - no bleeding  Played soccer, volleyball, basketball, tennis, track- would bleed very easily   Never had heavy menses  No nose bleeds, gum bleeds  No familiy history of bleeding bruising   Drinks alcohol daily moreson lately due to her social issues        PAST MEDICAL & SURGICAL HISTORY:      Allergies:  No Known Allergies      Medications:        Social History:    FAMILY HISTORY:      PHYSICAL EXAM:    T(F): 98 (23 @ 12:45), Max: 98.6 (23 @ 22:00)  HR: 74 (23 @ 12:45) (60 - 74)  BP: 152/84 (23 @ 12:45) (138/99 - 166/113)  RR: 19 (23 @ 12:45) (17 - 19)  SpO2: 97% (23 @ 12:45) (95% - 97%)  Wt(kg): --    Daily Height in cm: 177.8 (22 Aug 2023 02:54)    Daily Weight in k.7 (21 Aug 2023 22:00)    Gen: well developed, well nourished, comfortable  HEENT: normocephalic/atraumatic, no conjunctival pallor, no scleral icterus, no oral thrush/mucosal bleeding/mucositis  Neck: supple, no masses, no JVD  Cardiovascular: RR, nl S1S2, no murmurs/rubs/gallops  Respiratory: clear air entry b/l  Gastrointestinal: BS+, soft, NT/ND, no masses, no splenomegaly, no hepatomegaly, no evidence for ascites  Genitourinary: deferred  Rectal: deferred  Extremities: no clubbing/cyanosis, no edema, no calf tenderness  Neurological: CN 2-12 grossly intact, no focal deficits  Skin: no rash on visible skin  Lymph Nodes:  no cervical/supraclavicular LAD, no axillary/groin LAD  Musculoskeletal:  full ROM  Psychiatric:  mood stable            Labs:                          13.6   9.39  )-----------( 276      ( 22 Aug 2023 00:39 )             40.5     CBC Full  -  ( 22 Aug 2023 06:29 )  WBC Count : x  RBC Count : 3.96 M/uL  Hemoglobin : x  Hematocrit : x  Platelet Count - Automated : x  Mean Cell Volume : x  Mean Cell Hemoglobin : x  Mean Cell Hemoglobin Concentration : x  Auto Neutrophil # : x  Auto Lymphocyte # : x  Auto Monocyte # : x  Auto Eosinophil # : x  Auto Basophil # : x  Auto Neutrophil % : x  Auto Lymphocyte % : x  Auto Monocyte % : x  Auto Eosinophil % : x  Auto Basophil % : x    PT/INR - ( 22 Aug 2023 00:39 )   PT: 10.2 sec;   INR: 0.89          PTT - ( 22 Aug 2023 00:39 )  PTT:33.6 sec

## 2023-08-22 NOTE — H&P ADULT - HISTORY OF PRESENT ILLNESS
48 y.o.  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 Pt reports she initially had tihs procedure and then had intermittent bleeding after the procedure. She states the bleeding intensified and the pt required moncels and hemostatic agents in the office on . She then did not bleed for two days but began bleeding again on . She told her provider the bleeding was intensifying and she was taken back to the OR for cauterization of cervix on 8/15. She now presents once again due to persistent bleeding. She returned to he office on  and again had required moncels and bleeding attempted to be controlled but pt was only hemostatic for a few days until she began bleeding heavily. She states the day before today she saturated one pad per hour which is the most she has bled. Today. She saw her GYN ONC Dr. Santy Sanchez and Dr. Ibarra her hematologist. She was advised to go to the ED for evaluation of the bleeding by her gyn-onc PA and the hematologist began to work her up for a possible bleeding disorder. The pt has suspected she may have a bleeding disorder seeing as she was once told by a hematologist she may have VWF Deficiency in  when she was worked up after a miscarriage but the diagnosis was never confirmed. Currently, the pt was packed with 4x4s at the OSH and transferred to [facility]. The pt states the bleeding has slowed down since. She denies feeling, lightheaded, dizzy, sob, palpitations, nausea, vomiting, abdominal pain, diarrhea, dysuria.       OB/GYN Hx  G1:  MAB Med  G2:  VTOP D&C  G3:   SAB  G4:    G5:    PMHx: HTN, Asthma, Anxiety  SHx: L/ision, LEEP  (excessive bleeding for two weeks)  Meds: Lisinopril 10 QD, Lexapro 20 QD, Escitalopram 20 QD, Xanax .5 PRN, Ambien 10 PRN  Allergies: penicillin (unknown childhood reaction)  PHYSICAL EXAM:   Vital Signs Last 24 Hrs  T(C): 36.4 (22 Aug 2023 01:56), Max: 37 (21 Aug 2023 22:00)  T(F): 97.5 (22 Aug 2023 01:56), Max: 98.6 (21 Aug 2023 22:00)  HR: 74 (22 Aug 2023 01:56) (60 - 74)  BP: 154/103 (22 Aug 2023 01:56) (143/91 - 154/103)  BP(mean): --  RR: 17 (22 Aug 2023 01:56) (17 - 18)  SpO2: 96% (22 Aug 2023 01:56) (95% - 96%)    Parameters below as of 22 Aug 2023 01:56  Patient On (Oxygen Delivery Method): room air        **************************  Constitutional: Alert & Oriented x3, No acute distress  Respiratory: Clear to ausculation bilaterally; rhonchi, or crackles, minimal wheezing in b/l lung fields  Cardiovascular: regular rate and rhythm, no murmurs, or gallops  Gastrointestinal: soft, non tender, positive bowel sounds, no rebound or guarding   Pelvic exam: pending  Extremities: no calf tenderness or swelling      LABS:                        13.6   9.39  )-----------( 276      ( 22 Aug 2023 00:39 )             40.5           PT/INR - ( 22 Aug 2023 00:39 )   PT: 10.2 sec;   INR: 0.89          PTT - ( 22 Aug 2023 00:39 )  PTT:33.6 sec        RADIOLOGY & ADDITIONAL STUDIES:   48 y.o.  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 Pt reports she initially had tihs procedure and then had intermittent bleeding after the procedure. She states the bleeding intensified and the pt required moncels and hemostatic agents in the office on . She then did not bleed for two days but began bleeding again on . She told her provider the bleeding was intensifying and she was taken back to the OR for cauterization of cervix on 8/15. She now presents once again due to persistent bleeding. She returned to he office on  and again had required moncels and bleeding attempted to be controlled but pt was only hemostatic for a few days until she began bleeding heavily. She states the day before today she saturated one pad per hour which is the most she has bled. Today. She saw her GYN ONC Dr. Santy Sanchez and Dr. Ibarra her hematologist. She was advised to go to the ED for evaluation of the bleeding by her gyn-onc PA and the hematologist began to work her up for a possible bleeding disorder. The pt has suspected she may have a bleeding disorder seeing as she was once told by a hematologist she may have VWF Deficiency in  when she was worked up after a miscarriage but the diagnosis was never confirmed. Currently, the pt was packed with 4x4s at the OSH and transferred to [facility]. The pt states the bleeding has slowed down since. She denies feeling, lightheaded, dizzy, sob, palpitations, nausea, vomiting, abdominal pain, diarrhea, dysuria.       OB/GYN Hx  G1:  MAB Med  G2:  VTOP D&C  G3:   SAB  G4:    G5:    PMHx: HTN, Asthma, Anxiety  SHx: L/ision, LEEP  (excessive bleeding for two weeks)  Meds: Lisinopril 10 QD, Lexapro 20 QD, Xanax .5 PRN, Ambien 10 PRN, Albuterol PRN  Allergies: penicillin (unknown childhood reaction)  PHYSICAL EXAM:   Vital Signs Last 24 Hrs  T(C): 36.4 (22 Aug 2023 01:56), Max: 37 (21 Aug 2023 22:00)  T(F): 97.5 (22 Aug 2023 01:56), Max: 98.6 (21 Aug 2023 22:00)  HR: 74 (22 Aug 2023 01:56) (60 - 74)  BP: 154/103 (22 Aug 2023 01:56) (143/91 - 154/103)  BP(mean): --  RR: 17 (22 Aug 2023 01:56) (17 - 18)  SpO2: 96% (22 Aug 2023 01:56) (95% - 96%)    Parameters below as of 22 Aug 2023 01:56  Patient On (Oxygen Delivery Method): room air        **************************  Constitutional: Alert & Oriented x3, No acute distress  Respiratory: Clear to ausculation bilaterally; rhonchi, or crackles, minimal wheezing in b/l lung fields  Cardiovascular: regular rate and rhythm, no murmurs, or gallops  Gastrointestinal: soft, non tender, positive bowel sounds, no rebound or guarding   Pelvic exam: exam very limited secondary to pts inability to tolerate, cervix/posterior vagina with indistinct areas of bright red on speculum. no evidence of any pooling of blood or significant active bleeding, Moncels and surgicel powder applied on cervix and posterior vagina; vagina packed with 1/2 roll of vaginal packing soaked in lubricant; shaw catheter placed.   Extremities: no calf tenderness or swelling      LABS:                        13.6   9.39  )-----------( 276      ( 22 Aug 2023 00:39 )             40.5           PT/INR - ( 22 Aug 2023 00:39 )   PT: 10.2 sec;   INR: 0.89          PTT - ( 22 Aug 2023 00:39 )  PTT:33.6 sec        RADIOLOGY & ADDITIONAL STUDIES:

## 2023-08-22 NOTE — H&P ADULT - ASSESSMENT
48 y.o.  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  - Normal VS, Hgb 13.6, pt has no symptoms of anemia and is clinically and hemodynamically stable  - Bleeding is controlled at this time given there is no bright red blood on speculum exam or digital exam, packing is in situ  - Consult Hematology  - Consider reop for EUA and further cauterization on   - NPO and IVF    D/w Dr. Mcrae and Dr. Benton 48 y.o.  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  - Normal VS, Hgb 13.6, pt has no symptoms of anemia and is clinically and hemodynamically stable  - Given that speculum exam was very limited secondary to pts inability to tolerate, overall exam is still reassuring given no evidence of any pooling of blood or significant active bleeding. Moncels and surgicel powder were applied on cervix and posterior vagina; vagina packed with 1/2 roll of vaginal packing soaked in lubricant; shaw catheter placed.   - Consult Hematology  - Consider reop for EUA and further cauterization on   - NPO and IVF    D/w Dr. Mcrae and Dr. Lawson 48 y.o.  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  - Normal VS, Hgb 13.6, pt has no symptoms of anemia and is clinically and hemodynamically stable  - Given that speculum exam was very limited secondary to pts inability to tolerate, overall exam is still reassuring given no evidence of any pooling of blood or significant active bleeding. Moncels and surgicel powder were applied on cervix and posterior vagina; vagina packed with 1/2 roll of vaginal packing soaked in lubricant; shaw catheter placed.   - Consult Hematology  - Consider reop for EUA on   - NPO and IVF    D/w Dr. Mcrae and Dr. Lawson

## 2023-08-22 NOTE — CHART NOTE - NSCHARTNOTEFT_GEN_A_CORE
Hematology Oncology Chart Note (Full Consult Note to Follow in AM)    Information obtained from discussion with primary OBGYN team and chart review in [facility].    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 Post-op course notable for return to OR for cauterization of cervix on 8/15/23 now presents for follow up due to persistent bleeding over the weekend.    Patient has reportedly seen multiple hematologists over the past few years. Evaluated by [facility] Hematologist Brandon Ibarra on 8/21/23.  "It has been questioned over the years that she has von willdelgado's. Has seen multiple mds and have had multiple tests and results are questionable. States when having her last child in 8/2014, she hemorrhaged and almost needed a blood transfusion. States had several leep procedures performed with the same result of increased bleeding." Other notable history includes "No prior blood transfusion", "Had LEEP in 2020 and had excess bleeding x 2 weeks.", "No family h/o bleeding bruising", "Drinks alcohol daily-> moreso lately due to her social issues".    # r/o bleeding diathesis.  Vital signs currently stable without tachycardia or hypotension. Current blood work notable for CBC within normal limits. WBC 9.39, Hgb 13.6, HCT 40.5, Plt 276. Blood work from 8/21 ([place] Hgb 14.2 and [facility] Hgb 13.5). No anemia documented in chart, CBC from July 20 2023 Hgb 13.7 and HCT 40.4. Unclear if at baseline, values between 13.5-14.2 documented in past. No evidence of thrombocytopenia. Coagulation studies from 2020, 7/20/23, and 8/21-22/23 reviewed. No prolongation of PT, PTT, INR. Fibrinogen 280. VWF Factor activity 7/21/23 91% WNL. VWF multimer assay "VWF multimer analysis demonstrates a normal pattern and distribution of bands. This is the pattern of multimers that occurs in normal individuals as well as in those with type 1 von Willebrand disease (VWD), type 2M and type 2N VWD. Some acquired von Willebrand syndrome cases may yield a normal pattern as well."    Excessive bleeding history after procedures is concerning for a bleeding diathesis, however, reported extensive work up has been unable to pin a diagnosis. Unfortunately we have limited access to prior work ups on reported VWD levels. Unclear if patient has a disorder of primary hemostasis due to platelet function vs secondary hemostasis from coagulation factor dysfunction. In differential that was being worked up outpatient was VWD and Factor XIII deficiency. Of note, some patient's require multiple levels of Factor VIII and VWF as it can change during acute bleeding.     Plan:  - Please repeat PT/PTT/INR and fibrinogen in AM  - Active Type and Screen  - Please check Factor VIII, VWF activity, VWF antigen  - Please check Factor IX  - Please check Factor XIII assay and activity  - Please check platelet aggregation studies  - Please check reticulocyte count Hematology Oncology Chart Note (Full Consult Note to Follow in AM)    Information obtained from discussion with primary OBGYN team and chart review in [facility].    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 after transfer from [facility] for follow up of reported abnormal post-op bleeding requiring hemostatic agents. Post-op course notable for return to OR for cauterization of cervix on 8/15/23 now presents for follow up due to persistent bleeding over the weekend.    Patient has reportedly seen multiple hematologists over the past few years. Evaluated by [facility] Hematologist Brandon Ibarra on 8/21/23.  "It has been questioned over the years that she has von willdelgado's. Has seen multiple mds and have had multiple tests and results are questionable. States when having her last child in 8/2014, she hemorrhaged and almost needed a blood transfusion. States had several leep procedures performed with the same result of increased bleeding." Other notable history includes "No prior blood transfusion", "Had LEEP in 2020 and had excess bleeding x 2 weeks.", "No family h/o bleeding bruising", "Drinks alcohol daily-> moreso lately due to her social issues".    # r/o bleeding diathesis.  Vital signs currently stable without tachycardia or hypotension. Current blood work notable for CBC within normal limits. WBC 9.39, Hgb 13.6, HCT 40.5, Plt 276. Blood work from 8/21 ([facility] Hgb 14.2 and [facility] Hgb 13.5). No anemia documented in chart, CBC from July 20 2023 Hgb 13.7 and HCT 40.4. Unclear if at baseline, values between 13.5-14.2 documented in past. No evidence of thrombocytopenia. Coagulation studies from 2020, 7/20/23, and 8/21-22/23 reviewed. No prolongation of PT, PTT, INR. Fibrinogen 280. VWF Factor activity 7/21/23 91% WNL. VWF multimer assay "VWF multimer analysis demonstrates a normal pattern and distribution of bands. This is the pattern of multimers that occurs in normal individuals as well as in those with type 1 von Willebrand disease (VWD), type 2M and type 2N VWD. Some acquired von Willebrand syndrome cases may yield a normal pattern as well."    Excessive bleeding history after procedures is concerning for a bleeding diathesis, however, reported extensive work up has been unable to pin a diagnosis. Unfortunately we have limited access to prior work ups on reported VWD levels. Unclear if patient has a disorder of primary hemostasis due to platelet function vs secondary hemostasis from coagulation factor dysfunction. In differential that was being worked up outpatient was VWD and Factor XIII deficiency. Of note, some patient's require multiple levels of Factor VIII and VWF as it can change during acute bleeding.     Plan:  - Please repeat PT/PTT/INR and fibrinogen in AM  - Active Type and Screen  - Please check Factor VIII, VWF activity, VWF antigen  - Please check Factor IX  - Please check Factor XIII assay and activity  - Please check platelet aggregation studies  - Please check reticulocyte count  - Daily CBC with differential    To be discussed with hematology oncology attending Dr. Leonardo Vital. Recommendations are considered final after attending attestation.

## 2023-08-22 NOTE — CONSULT NOTE ADULT - ASSESSMENT
48 year old female with hx of two spontaneous abortions, HTN who admitted to the ED for vaginal bleeding. Hematology/Oncology is consulted for working up of bleeding diathesis. Patient reports distant history of VWD, though have not found sufficient evidence yet to diagnose this. Given her extended recovery time after these procedures and hx of childhood bruising, inherited bleeding disorder cannot be rule out. Her normal PT/PTT do not support typical factor coagulopathy, though vWD has many subtypes, some of which can present with normal coagulation studies. Her case does warrant further workup of von Willebrand disease, and thus we will recommend further workup. Currently her hemoglobin is stable (of note no documented hx of low hemoglobin in our records) and she is hemodynamically stable. Normal fibrinogen supports less likely DIC.     Plan   - Pending labs Factor IX Assay, Factor VIII Assay, Factor XIII activity, Factor XIII assay  - Would obtain Vitamin C, ANCA, TSH, VWF antigen/activity (patient did not have these labs obtained at outpatient office visit)  - Will defer local hemostasis to OBGYN; please note TXA wipes should not interfere with coagulation studies  - Will examine peripheral blood smear    Discussed with Dr. Vital  48 year old female with hx of two spontaneous abortions, HTN who admitted to the ED for vaginal bleeding. Hematology/Oncology is consulted for working up of bleeding diathesis. Patient reports distant history of VWD, though have not found sufficient evidence yet to diagnose this. Given her extended recovery time after these procedures and hx of childhood bruising, inherited bleeding disorder cannot be rule out. Her normal PT/PTT do not support typical factor coagulopathy, though vWD has many subtypes, some of which can present with normal coagulation studies. Her case does warrant further workup of von Willebrand disease, and thus we will recommend further workup. Currently her hemoglobin is stable (of note no documented hx of low hemoglobin in our records) and she is hemodynamically stable. Normal fibrinogen supports less likely DIC. Reticulocytes 1.6%    Plan   - Pending labs Factor IX Assay, Factor VIII Assay, Factor XIII activity, Factor XIII assay  - Would obtain Vitamin C, ANCA, TSH, VWF antigen/activity (patient did not have these labs obtained at outpatient office visit)  - Will defer local hemostasis to OBGYN; please note TXA wipes should not interfere with coagulation studies  - Will examine peripheral blood smear    Discussed with Dr. Vital  48 year old female with hx of two spontaneous abortions, HTN who admitted to the ED for vaginal bleeding. Hematology/Oncology is consulted for working up of bleeding diathesis. Patient reports distant history of VWD, though have not found sufficient evidence yet to diagnose this. Given her extended recovery time after these procedures and hx of childhood bruising, inherited bleeding disorder cannot be rule out. Her normal PT/PTT do not support typical factor coagulopathy, though vWD has many subtypes, some of which can present with normal coagulation studies. Her case does warrant further workup of von Willebrand disease, and thus we will recommend further workup. Currently her hemoglobin is stable (of note no documented hx of low hemoglobin in our records) and she is hemodynamically stable. Normal fibrinogen supports less likely DIC. Reticulocytes 1.6%.     #Vaginal Bleeding  #Concern for Bleeding Diathesis     Plan   - Pending labs Factor IX Assay, Factor VIII Assay, Factor XIII activity, Factor XIII assay  - Would obtain Vitamin C, ANCA, TSH, VWF antigen/activity (patient did not have these labs obtained at outpatient office visit)  - Will defer local hemostasis to OBGYN; please note TXA wipes should not interfere with coagulation studies  - Will examine peripheral blood smear    Discussed with Dr. Vital

## 2023-08-22 NOTE — CHART NOTE - NSCHARTNOTEFT_GEN_A_CORE
Patient seen at bedside. Ambulating throughout the day and voiding freely. Vaginal packing and urinary shaw removed in the morning. Dark brown blood noted on pad, minimally stained, no bright red active bleeding. Patient had one episode of vomiting. IV zofran and IV tranexamic to be administered (discussed with haematology, no contraindications), nurse informed. Plan to defer EUA today in light of no active bleeding, transition to regular diet with NPO/IV fluids at midnight in case of need for EUA tomorrow. Patient aware and agrees with the plan.     JUAN MIGUEL Napier  Gynecologic Oncology Fellow Patient seen at bedside. Ambulating throughout the day and voiding freely. Vaginal packing and urinary shaw removed in the morning. Dark brown blood noted on pad, minimally stained, no bright red active bleeding. Patient had one episode of vomiting. IV zofran and IV tranexamic to be administered (discussed with haematology, no contraindications), nurse informed. s/p IR consult, UAE not currently indicated. Plan to defer EUA today in light of no active bleeding, transition to regular diet with NPO/IV fluids at midnight in case of need for EUA tomorrow. Patient aware and agrees with the plan.     JUAN MIGUEL Napier  Gynecologic Oncology Fellow

## 2023-08-22 NOTE — PROGRESS NOTE ADULT - ASSESSMENT
47 yo  lmp  presenting from outside hospital due to persistent vaginal bleeding after EUA/CKC/ECC on 23 for CIN3/HPV16. bleeding intensified, requiring moncels and hemostatic agents in the office on . Bleeding improved then worsened, she taken back to the OR for cauterization of cervix on 8/15. Today pt saw her GYN ONC Dr. Santy BARRIENTOS (who packed her w/ 2 4x4 gauze) and Dr. Ibarra her hematologist (who began bleeding disorder workup). She was then advised to go to the ED. Transferred to [facility] since Dr. Linares is not in town.    Neuro: tylenol, oxy 5 prn  Psych: anxiety, home lexapro ordered  Pulm: asthma, albuterol prn ordered, RA  CV: HTN, home lisinopril ordered  GI/FEN: /hr, NPO, zofran/reglan prn  GYN: hx CIN3 s/p CKC admitted for heavy VB. 1/2 roll vaginal packing (w/surgicel powder and monsel) placed  at 2am  : shaw  Heme: Hgb 13.7   [place] Hgb 13.5>>>>13.6; Heme following ; possible bleeding diathesis?  DVT prophylaxis: SCD    [ ] f/u Factor VIII, IX, XIII, retic count (ordered STAT)  [ ] held off on platelet aggregation studies, VWF activty, VWF antigen since those need to be drawn together, and needs a special tech to run. Touch base with heme this morning to see when they are available, and draw when they are.   [ ] Will consider EUA in the OR

## 2023-08-23 ENCOUNTER — TRANSCRIPTION ENCOUNTER (OUTPATIENT)
Age: 48
End: 2023-08-23

## 2023-08-23 VITALS — DIASTOLIC BLOOD PRESSURE: 100 MMHG | SYSTOLIC BLOOD PRESSURE: 149 MMHG | HEART RATE: 74 BPM

## 2023-08-23 LAB
BASOPHILS # BLD AUTO: 0.04 K/UL — SIGNIFICANT CHANGE UP (ref 0–0.2)
BASOPHILS NFR BLD AUTO: 0.3 % — SIGNIFICANT CHANGE UP (ref 0–2)
EOSINOPHIL # BLD AUTO: 0.18 K/UL — SIGNIFICANT CHANGE UP (ref 0–0.5)
EOSINOPHIL NFR BLD AUTO: 1.3 % — SIGNIFICANT CHANGE UP (ref 0–6)
HCT VFR BLD CALC: 37.9 % — SIGNIFICANT CHANGE UP (ref 34.5–45)
HGB BLD-MCNC: 12.6 G/DL — SIGNIFICANT CHANGE UP (ref 11.5–15.5)
IMM GRANULOCYTES NFR BLD AUTO: 0.3 % — SIGNIFICANT CHANGE UP (ref 0–0.9)
LYMPHOCYTES # BLD AUTO: 1.26 K/UL — SIGNIFICANT CHANGE UP (ref 1–3.3)
LYMPHOCYTES # BLD AUTO: 9.2 % — LOW (ref 13–44)
MCHC RBC-ENTMCNC: 31.7 PG — SIGNIFICANT CHANGE UP (ref 27–34)
MCHC RBC-ENTMCNC: 33.2 GM/DL — SIGNIFICANT CHANGE UP (ref 32–36)
MCV RBC AUTO: 95.5 FL — SIGNIFICANT CHANGE UP (ref 80–100)
MONOCYTES # BLD AUTO: 0.63 K/UL — SIGNIFICANT CHANGE UP (ref 0–0.9)
MONOCYTES NFR BLD AUTO: 4.6 % — SIGNIFICANT CHANGE UP (ref 2–14)
NEUTROPHILS # BLD AUTO: 11.6 K/UL — HIGH (ref 1.8–7.4)
NEUTROPHILS NFR BLD AUTO: 84.3 % — HIGH (ref 43–77)
NRBC # BLD: 0 /100 WBCS — SIGNIFICANT CHANGE UP (ref 0–0)
PLATELET # BLD AUTO: 242 K/UL — SIGNIFICANT CHANGE UP (ref 150–400)
RBC # BLD: 3.97 M/UL — SIGNIFICANT CHANGE UP (ref 3.8–5.2)
RBC # FLD: 13.2 % — SIGNIFICANT CHANGE UP (ref 10.3–14.5)
TSH SERPL-MCNC: 0.8 UIU/ML — SIGNIFICANT CHANGE UP (ref 0.27–4.2)
WBC # BLD: 13.75 K/UL — HIGH (ref 3.8–10.5)
WBC # FLD AUTO: 13.75 K/UL — HIGH (ref 3.8–10.5)

## 2023-08-23 PROCEDURE — 86901 BLOOD TYPING SEROLOGIC RH(D): CPT

## 2023-08-23 PROCEDURE — 85240 CLOT FACTOR VIII AHG 1 STAGE: CPT

## 2023-08-23 PROCEDURE — 85291 CLOT FACTOR XIII FIBRIN SCRN: CPT

## 2023-08-23 PROCEDURE — 85025 COMPLETE CBC W/AUTO DIFF WBC: CPT

## 2023-08-23 PROCEDURE — 82180 ASSAY OF ASCORBIC ACID: CPT

## 2023-08-23 PROCEDURE — 81001 URINALYSIS AUTO W/SCOPE: CPT

## 2023-08-23 PROCEDURE — 85610 PROTHROMBIN TIME: CPT

## 2023-08-23 PROCEDURE — 85245 CLOT FACTOR VIII VW RISTOCTN: CPT

## 2023-08-23 PROCEDURE — 85730 THROMBOPLASTIN TIME PARTIAL: CPT

## 2023-08-23 PROCEDURE — 85246 CLOT FACTOR VIII VW ANTIGEN: CPT

## 2023-08-23 PROCEDURE — 84443 ASSAY THYROID STIM HORMONE: CPT

## 2023-08-23 PROCEDURE — 36415 COLL VENOUS BLD VENIPUNCTURE: CPT

## 2023-08-23 PROCEDURE — 85250 CLOT FACTOR IX PTC/CHRSTMAS: CPT

## 2023-08-23 PROCEDURE — 86036 ANCA SCREEN EACH ANTIBODY: CPT

## 2023-08-23 PROCEDURE — 85045 AUTOMATED RETICULOCYTE COUNT: CPT

## 2023-08-23 PROCEDURE — 85290 CLOT FACTOR XIII FIBRIN STAB: CPT

## 2023-08-23 PROCEDURE — 85384 FIBRINOGEN ACTIVITY: CPT

## 2023-08-23 PROCEDURE — 86850 RBC ANTIBODY SCREEN: CPT

## 2023-08-23 PROCEDURE — 86900 BLOOD TYPING SEROLOGIC ABO: CPT

## 2023-08-23 PROCEDURE — 81025 URINE PREGNANCY TEST: CPT

## 2023-08-23 RX ORDER — TRANEXAMIC ACID 100 MG/ML
2 INJECTION, SOLUTION INTRAVENOUS
Qty: 30 | Refills: 0
Start: 2023-08-23 | End: 2023-08-27

## 2023-08-23 RX ORDER — LISINOPRIL 2.5 MG/1
1 TABLET ORAL
Qty: 0 | Refills: 0 | DISCHARGE
Start: 2023-08-23

## 2023-08-23 RX ORDER — ACETAMINOPHEN 500 MG
2 TABLET ORAL
Qty: 0 | Refills: 0 | DISCHARGE
Start: 2023-08-23

## 2023-08-23 RX ADMIN — Medication 1000 MILLIGRAM(S): at 06:42

## 2023-08-23 RX ADMIN — Medication 200 MILLIGRAM(S): at 06:42

## 2023-08-23 RX ADMIN — Medication 200 MILLIGRAM(S): at 14:52

## 2023-08-23 RX ADMIN — Medication 1000 MILLIGRAM(S): at 11:46

## 2023-08-23 RX ADMIN — LISINOPRIL 10 MILLIGRAM(S): 2.5 TABLET ORAL at 06:45

## 2023-08-23 RX ADMIN — ESCITALOPRAM OXALATE 20 MILLIGRAM(S): 10 TABLET, FILM COATED ORAL at 11:46

## 2023-08-23 RX ADMIN — SODIUM CHLORIDE 75 MILLILITER(S): 9 INJECTION, SOLUTION INTRAVENOUS at 00:00

## 2023-08-23 NOTE — CONSULT NOTE ADULT - SUBJECTIVE AND OBJECTIVE BOX
47 yo  lmp  presenting from outside hospital due to persistent vaginal bleeding after EUA/CKC/ECC on 23 for CIN3/HPV16. bleeding intensified, requiring moncels and hemostatic agents in the office on  and . Bleeding improved then worsened, she taken back to the OR for cauterization of cervix on 8/15.  pt saw her GYN ONC Dr. Santy BARRIENTOS (who packed her w/ 2 4x4 gauze) and Dr. Huntley her hematologist (who began bleeding disorder workup, was told in the past she may have bleeding d/o during a miscarriage). She was then advised to go to the ED. Transferred to [facility] since Dr. Linares is not in town. Pt was packed at [facility] ED overnight, removed  AM, pad placed now removed with scant dark red blood. Pt hemodynamically stable, hgb 13.6. IR consulted for possible pelvic angiogram to identify source of bleeding.     Clinical History: POST OP COMPLICATIONS    Handoff    MEWS Score    Cervical intraepithelial neoplasia (LAUREN)    SysAdmin_VstLnk        Meds:acetaminophen     Tablet .. 1000 milliGRAM(s) Oral every 6 hours  albuterol    90 MICROgram(s) HFA Inhaler 2 Puff(s) Inhalation every 6 hours PRN  escitalopram 20 milliGRAM(s) Oral daily  lactated ringers. 1000 milliLiter(s) IV Continuous <Continuous>  lisinopril 10 milliGRAM(s) Oral daily  metoclopramide Injectable 10 milliGRAM(s) IV Push every 8 hours PRN  ondansetron Injectable 8 milliGRAM(s) IV Push every 8 hours PRN  oxyCODONE    IR 5 milliGRAM(s) Oral every 6 hours PRN  phenazopyridine 200 milliGRAM(s) Oral every 8 hours  zolpidem 5 milliGRAM(s) Oral at bedtime PRN      Allergies:No Known Allergies        Labs:                           13.6   9.39  )-----------( 276      ( 22 Aug 2023 00:39 )             40.5     PT/INR - ( 22 Aug 2023 00:39 )   PT: 10.2 sec;   INR: 0.89          PTT - ( 22 Aug 2023 00:39 )  PTT:33.6 sec              Imaging Findings: reviewed

## 2023-08-23 NOTE — DISCHARGE NOTE PROVIDER - NSDCMRMEDTOKEN_GEN_ALL_CORE_FT
acetaminophen 500 mg oral tablet: 2 tab(s) orally every 6 hours as needed for  mild pain  lisinopril 10 mg oral tablet: 1 tab(s) orally once a day   acetaminophen 500 mg oral tablet: 2 tab(s) orally every 6 hours as needed for  mild pain  lisinopril 10 mg oral tablet: 1 tab(s) orally once a day  tranexamic acid 650 mg oral tablet: 2 tab(s) orally every 8 hours

## 2023-08-23 NOTE — DISCHARGE NOTE PROVIDER - NSDCFUSCHEDAPPT_GEN_ALL_CORE_FT
[facility]  [facility] [address]  Scheduled Appointment: 09/06/2023

## 2023-08-23 NOTE — DISCHARGE NOTE PROVIDER - HOSPITAL COURSE
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 Bleeding worsened, requiring moncels and hemostatic agents in the office on . She taken back to the OR for cauterization of cervix on 8/15. Pt saw her GYN ONC Dr. Santy BARRIENTOS on  (who packed her w/ 2 4x4 gauze) and Dr. Ibarra her hematologist (who began bleeding disorder workup). She was then advised to go to the ED. Transferred to [facility] since Dr. Linares is not in town. Patient had packing removed, with new packing placed on day of admission. Packing removed after 7 hours, minimal bleeding noted at that time. While in house, hematology was consulted, and bleeding diathesis workup was started.   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 Bleeding worsened, requiring moncels and hemostatic agents in the office on . She taken back to the OR for cauterization of cervix on 8/15. Pt saw her GYN ONC Dr. Santy BARRIENTOS on  (who packed her w/ 2 4x4 gauze) and Dr. Ibarra her hematologist (who began bleeding disorder workup). She was then advised to go to the ED. Transferred to [facility] since Dr. Linares is not in town. Patient had packing removed, with new packing placed on day of admission. Packing removed after 7 hours, minimal bleeding noted at that time. While in house, hematology was consulted, and bleeding diathesis workup was started. Pt was given 1g TXA in house. Pt was having no new bleeding during hospital stay, decision was made to not have EUA. Patient is ambulating on own, eating regular diet, voiding, and passing flatus. Pt is safe for discharge.     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 Bleeding worsened, requiring moncels and hemostatic agents in the office on . She taken back to the OR for cauterization of cervix on 8/15. Pt saw her GYN ONC Dr. Santy BARRIENTOS on  (who packed her w/ 2 4x4 gauze) and Dr. Ibarra her hematologist (who began bleeding disorder workup). She was then advised to go to the ED. Transferred to [facility] since Dr. Linares is not in town. Patient had packing removed, with new packing placed on day of admission. Packing removed after 7 hours, minimal bleeding noted at that time. While in house, hematology was consulted, and bleeding diathesis workup was started. Pt was given 1g TXA in house. Pt was having no heavy bleeding during hospital stay, decision was made to not have EUA. Patient is ambulating on own, eating regular diet, voiding, and passing flatus. Pt is safe for discharge.

## 2023-08-23 NOTE — DISCHARGE NOTE PROVIDER - NSDCFUADDINST_GEN_ALL_CORE_FT
- Nothing in vagina - no intercourse, tampons, or douching until cleared by your doctor.   - Avoid swimming, tub baths, strenuous activity and heavy lifting until cleared by your doctor.   - Showering is ok.   - Continue oral pain medications as needed for pain.    - Follow up in office in 2 weeks for your postoperative visit.  Call the office to confirm your follow up appointment.   - Call the office sooner if you develop any fever, heavy bleeding, or severe pain.  Go to the closest emergency room for any of these symptoms if you are not able to contact your doctor.

## 2023-08-23 NOTE — CONSULT NOTE ADULT - ASSESSMENT
Assessment: 47 yo  lmp  presenting from outside hospital due to persistent vaginal bleeding after EUA/CKC/ECC on 23 for CIN3/HPV16. bleeding intensified, requiring moncels and hemostatic agents in the office on  and , taken back to the OR for cauterization of cervix on 8/15.  pt saw her GYN ONC Dr. Santy BARRIENTOS (who packed her w/ 2 4x4 gauze). Admitted for further workup. Pt hemodynamically stable, hgb 13.6. IR consulted for possible pelvic angiogram to identify source of bleeding. Case reviewed with Dr. Sevilla, at this time there is no indication for pelvic angiogram as pt is stable with scant vaginal bleeding. Continue to monitor vitals and hgb, consider CTA if continued bleeding. Rest of care per primary team.      Communicated with: David CROSS

## 2023-08-23 NOTE — DISCHARGE NOTE PROVIDER - NSDCCPCAREPLAN_GEN_ALL_CORE_FT
PRINCIPAL DISCHARGE DIAGNOSIS  Diagnosis: Cervical intraepithelial neoplasia (LAUREN)  Assessment and Plan of Treatment:

## 2023-08-23 NOTE — DISCHARGE NOTE NURSING/CASE MANAGEMENT/SOCIAL WORK - NSDCPEFALRISK_GEN_ALL_CORE
For information on Fall & Injury Prevention, visit: [email or web address] OR  [email or web address] OR  https://www.cdc.gov/steadi/patient.html

## 2023-08-23 NOTE — DISCHARGE NOTE PROVIDER - CARE PROVIDERS DIRECT ADDRESSES
,DirectAddress_Unknown ,DirectAddress_Unknown,[email or web address]

## 2023-08-23 NOTE — DISCHARGE NOTE NURSING/CASE MANAGEMENT/SOCIAL WORK - PATIENT PORTAL LINK FT
You can access the FollowMyHealth Patient Portal offered by [facility] by registering at the following website: [email or web address]. By joining [employer]’s FollowMyHealth portal, you will also be able to view your health information using other applications (apps) compatible with our system.

## 2023-08-23 NOTE — PROGRESS NOTE ADULT - SUBJECTIVE AND OBJECTIVE BOX
Pt seen and examined at bedside. Pt states vaginal discomfort with the packing, discomfort with shaw..  Pt is ambulating, currently NPO, + flatus, urinating adequately with a shaw.   Pt denies fever, chills, chest pain, SOB, nausea, vomiting, lightheadedness, dizziness.      T(F): 97.8 (08-22-23 @ 05:42), Max: 98.6 (08-21-23 @ 22:00)  HR: 67 (08-22-23 @ 05:42) (60 - 74)  BP: 147/93 (08-22-23 @ 05:42) (138/99 - 166/113)  RR: 17 (08-22-23 @ 05:42) (17 - 18)  SpO2: 97% (08-22-23 @ 05:42) (95% - 97%)  I&O's Summary    21 Aug 2023 07:01  -  22 Aug 2023 07:00  --------------------------------------------------------  IN: 750 mL / OUT: 300 mL / NET: 450 mL        MEDICATIONS  (STANDING):  acetaminophen     Tablet .. 1000 milliGRAM(s) Oral every 6 hours  escitalopram 20 milliGRAM(s) Oral daily  lactated ringers. 1000 milliLiter(s) (125 mL/Hr) IV Continuous <Continuous>  lisinopril 10 milliGRAM(s) Oral every 24 hours    MEDICATIONS  (PRN):  albuterol    90 MICROgram(s) HFA Inhaler 2 Puff(s) Inhalation every 6 hours PRN Bronchospasm  metoclopramide Injectable 10 milliGRAM(s) IV Push every 8 hours PRN nausea/vomiting  ondansetron Injectable 8 milliGRAM(s) IV Push every 8 hours PRN nausea and vomitin  oxyCODONE    IR 5 milliGRAM(s) Oral every 6 hours PRN Severe Pain (7 - 10)      Physical Exam:  Constitutional: NAD  Pulmonary: comfortable on RA  Abdomen: Soft, mildly tender, non distended, no guarding, no rebound, + bowel sounds  Packing is in vagina, no bleeding through packing  Extremities: no lower extremity edema or calve tenderness.     LABS:                        13.6   9.39  )-----------( 276      ( 22 Aug 2023 00:39 )             40.5           PT/INR - ( 22 Aug 2023 00:39 )   PT: 10.2 sec;   INR: 0.89          PTT - ( 22 Aug 2023 00:39 )  PTT:33.6 sec    
Pt seen and examined at bedside. Pt denies abdominal pain. Reports small amount of dark brown bleeding overnight. Denies heavy or bright red bleeding.    Pt [x] ambulating, [x] urinating adequately.   Pt denies fever, chills, chest pain, SOB, nausea, vomiting, lightheadedness, dizziness.      T(F): 97.6 (23 @ 05:19), Max: 98.1 (23 @ 20:35)  HR: 97 (23 @ 06:40) (62 - 97)  BP: 150/88 (23 @ 06:40) (146/89 - 169/92)  RR: 17 (23 @ 05:19) (17 - 19)  SpO2: 98% (23 @ 05:19) (97% - 98%)  Wt(kg): --  I&O's Summary    21 Aug 2023 07:  -  22 Aug 2023 07:00  --------------------------------------------------------  IN: 750 mL / OUT: 300 mL / NET: 450 mL    22 Aug 2023 07:  -  23 Aug 2023 06:58  --------------------------------------------------------  IN: 1382 mL / OUT: 3750 mL / NET: -2368 mL        MEDICATIONS  (STANDING):  acetaminophen     Tablet .. 1000 milliGRAM(s) Oral every 6 hours  escitalopram 20 milliGRAM(s) Oral daily  lactated ringers. 1000 milliLiter(s) (75 mL/Hr) IV Continuous <Continuous>  lisinopril 10 milliGRAM(s) Oral daily  phenazopyridine 200 milliGRAM(s) Oral every 8 hours    MEDICATIONS  (PRN):  albuterol    90 MICROgram(s) HFA Inhaler 2 Puff(s) Inhalation every 6 hours PRN Bronchospasm  metoclopramide Injectable 10 milliGRAM(s) IV Push every 8 hours PRN nausea/vomiting  ondansetron Injectable 8 milliGRAM(s) IV Push every 8 hours PRN nausea and vomitin  oxyCODONE    IR 5 milliGRAM(s) Oral every 6 hours PRN Severe Pain (7 - 10)  zolpidem 5 milliGRAM(s) Oral at bedtime PRN Insomnia      Physical Exam:  Constitutional: NAD  Pulmonary: no increased work of breathing  Cardiovascular: Regular rate and rhythm   Abdomen: Soft, nontender   Extremities: no lower extremity edema or calf tenderness. SCDs in place   : scant amount of dark brown blood on pad, pad present for several hours     LABS:                        13.6   9.39  )-----------( 276      ( 22 Aug 2023 00:39 )             40.5           PT/INR - ( 22 Aug 2023 00:39 )   PT: 10.2 sec;   INR: 0.89          PTT - ( 22 Aug 2023 00:39 )  PTT:33.6 sec  Urinalysis Basic - ( 22 Aug 2023 19:00 )    Color: Yellow / Appearance: SL Cloudy / S.015 / pH: x  Gluc: x / Ketone: NEGATIVE  / Bili: Negative / Urobili: 0.2 E.U./dL   Blood: x / Protein: 30 mg/dL / Nitrite: NEGATIVE   Leuk Esterase: Moderate / RBC: Many /HPF / WBC Many /HPF   Sq Epi: x / Non Sq Epi: x / Bacteria: Present /HPF        RADIOLOGY & ADDITIONAL TESTS:

## 2023-08-23 NOTE — DISCHARGE NOTE PROVIDER - CARE PROVIDER_API CALL
Jeny Linares  Gynecologic Oncology  [address] [place] [address]  Phone: [phone]  Fax: [phone]  Follow Up Time:    Jeny Linares  Gynecologic Oncology  [address] [place] [address]  Phone: [phone]  Fax: [phone]  Follow Up Time:     Zeina Benton  Gynecologic Oncology  [address]  [place] [address]  Phone: [phone]  Fax: [phone]  Follow Up Time:     Brandon Ibarra  Medical Oncology  [address] [place] [address]  Phone: [phone]  Fax: [phone]  Follow Up Time:

## 2023-08-23 NOTE — PROGRESS NOTE ADULT - ASSESSMENT
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  Now s/p removal of vaginal packing x 1 with monsels and surgicel powder. s/p TXA    Neuro: tylenol, oxy 5 prn  Psych: anxiety, home lexapro ordered  Pulm: asthma, albuterol prn ordered, RA  CV: HTN, home lisinopril ordered  GI/FEN: /hr, NPO, zofran/reglan prn  GYN: hx CIN3 s/p CKC admitted for heavy VB. s/p 1/2 roll vaginal packing (w/surgicel powder and monsel) placed  and removed in AM  :  voiding  Heme: Hgb 13.7   [facility] Hgb 13.5>>>>13.6; Heme following for possible bleeding diathesis   DVT prophylaxis: SCD    [ ] f/u Factor XIII assay and activity (recieved)   [ ] f/u Vitamin C, ANCA, TSH, VWF antigen/activity (ordered for  AM)  [ ] Will consider EUA in the OR

## 2023-08-23 NOTE — DISCHARGE NOTE PROVIDER - PROVIDER TOKENS
PROVIDER:[TOKEN:[[id number]]] PROVIDER:[TOKEN:[[id number]]],PROVIDER:[TOKEN:[[id number]]],PROVIDER:[TOKEN:[[id number]]]

## 2023-08-24 LAB
AUTO DIFF PNL BLD: NEGATIVE — SIGNIFICANT CHANGE UP
C-ANCA SER-ACNC: NEGATIVE — SIGNIFICANT CHANGE UP
FACT XIII ACT/NOR PPP CHRO: 111 % — SIGNIFICANT CHANGE UP (ref 51–163)
P-ANCA SER-ACNC: NEGATIVE — SIGNIFICANT CHANGE UP
VWF AG ACT/NOR PPP IA: 126 % — SIGNIFICANT CHANGE UP (ref 63–170)
VWF:RCO ACT/NOR PPP PL AGG: 110 % — SIGNIFICANT CHANGE UP (ref 45–133)

## 2023-08-25 ENCOUNTER — NON-APPOINTMENT (OUTPATIENT)
Age: 48
End: 2023-08-25

## 2023-08-26 ENCOUNTER — NON-APPOINTMENT (OUTPATIENT)
Age: 48
End: 2023-08-26

## 2023-08-26 LAB — VIT C SERPL-MCNC: 0.5 MG/DL — SIGNIFICANT CHANGE UP (ref 0.4–2)

## 2023-08-30 ENCOUNTER — RESULT REVIEW (OUTPATIENT)
Age: 48
End: 2023-08-30

## 2023-08-30 ENCOUNTER — APPOINTMENT (OUTPATIENT)
Dept: GYNECOLOGIC ONCOLOGY | Facility: CLINIC | Age: 48
End: 2023-08-30
Payer: COMMERCIAL

## 2023-08-30 VITALS — HEART RATE: 90 BPM | SYSTOLIC BLOOD PRESSURE: 121 MMHG | DIASTOLIC BLOOD PRESSURE: 87 MMHG | OXYGEN SATURATION: 99 %

## 2023-08-30 DIAGNOSIS — N88.8 OTHER SPECIFIED NONINFLAMMATORY DISORDERS OF CERVIX UTERI: ICD-10-CM

## 2023-08-30 PROCEDURE — 99213 OFFICE O/P EST LOW 20 MIN: CPT | Mod: 24

## 2023-08-30 NOTE — HISTORY OF PRESENT ILLNESS
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 Patient returned to OR for cauterization of cervix on 8/15/23. She has had persistent bleeding as outlined below. She was seen by Dr. Ibarra on 8/21/23 for extensive hematologic work up given patient has hx of similar bleeding after LEEP a couple years ago and 10yrs ago at time of last pregnancy she had severe hemorrhage and was advised that she had a thrombophilia and would need DDAVP or other agents prior to surgical procedures. Pt only recently shared letter from hematologist she saw 10yrs ago.   Interval hx: 8/21/23: heaving bleeding in office requiring vaginal packing with monsels. patient transferred to [facility] as Dr. Linares out of town. Pt monitored in house with stable H/H, stable vitals and no further bleeding 8/24/-8/29/23: pt continued to have intermittent mod to heavy bleeding and pain, was seen at [facility] and discharged home with stable H/H (hgb 13) and given painmeds.  Today patient reports she went almost a full day without bleeding and then had some light staining of red blood last night. She denies si/sxs of anemia. she continues to perform light activity and continues pelvic rest and no heavy lifting  Pelvic sono 8/14/23: FINDINGS: Uterus: Measures 8.1 x 4.1 x 4.9 cm. Left-sided fibroid is noted measuring 1.4 cm. Echogenic linear structure is noted in the endocervical region.  Endometrium: Measures 6 mm.  Right ovary: Measures 2.0 x 1.1 x 1.9 cm. No mass lesions. Normal vascular flow.  Left ovary: Measures 2.8 x 1.8 x 2.5 cm. No mass lesions. Normal vascular flow.  Cul-de-sac: No free fluid.  IMPRESSION: Small uterine fibroid.  Echogenic linear structure in the endocervical region could represent suture material related to recent cone biopsy.  No acute pathology.  --- End of Report ---

## 2023-08-30 NOTE — DISCUSSION/SUMMARY
[Reviewed Clinical Lab Test(s)] : Results of clinical tests were reviewed. [Discuss Alternatives/Risks/Benefits w/Patient] : All alternatives, risks, and benefits were discussed with the patient/family and all questions were answered.  Patient expressed good understanding and appreciates the importance of follow up as recommended. [Visit Time ___ Minutes] : [unfilled] minutes [Face to Face Time___ Minutes] : with [unfilled] minutes in face to face consultation. 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 Post-op course c/b 3wks+ of persistent vaginal bleeding presumed to be related to undiagnosed bleeding diathesis. Current work up by Heme/Onc is negative. Patient with persistent bleeding and plan for definitive surgical mgmt. -more than 50% of visit spent face to face with patient counseling and coordinating care -reviewed exam today and no active bleeding, no immediate intervention needed at this time. I explained to pt that plan will be for definitive hyst/bs however best to wait 6-8wks for complete healing from CKC/ECC. We discussed retaining ovaries since patient is lizzy-menopausal and will still benefit from residual hormone function. Also recent pelvic sono with normal ovaries. Will send labs today to ensure no anemia and pt to continue with bleeding and anemia precautions. If heavy bleeding again or anemia sxs then will move surgery date up -will f/u with [facility] on final plan for mgmt of clinical blood diathesis and discuss if DDAVP or cyroprecipitate needed pre-op -labs today: cbc/pt/ptt/inr/fibrinogen. will call with results -Surgical booking: Robotic tlh/bs/possible laparotomy -[facility]AS, pre-op clearance at [facility], labs, covid testing as per protocol -the above diagnosis, nature of treatment, procedure, options, benefits, and risks were reviewed. I explained in detail the possible complications including but not limited to bleeding, transfusion, transfusion complications, infection, injury to internal organs such as injury to gastrointestinal or urinary tract, possible fistula formation, prolonged catherization, intestinal or urinary tract obstruction, vascular injury, wound complications, venous thrombosis, pulmonary embolus, sepsis, pain, chronic disability, and fatal complications, possible re-operation to correct complications, and possible abandoning or modifying the procedure due to inoperative findings was discussed. All questions were answered. Patient verbally indicated that she understood the nature of treatment, indications, options, benefits, and risks. Patient elected and consented to the procedure. Pt was informed that there was no guarantee for outcome or cure, and that additional therapy may be indicated depending on the operative findings.  -f/u post-op.  -pain/fever/bleeding precautions given

## 2023-09-01 LAB — FACT XIII ACT/NOR PPP CHRO: 146 % ACTIV. — SIGNIFICANT CHANGE UP (ref 57–192)

## 2023-09-06 ENCOUNTER — RESULT REVIEW (OUTPATIENT)
Age: 48
End: 2023-09-06

## 2023-09-06 ENCOUNTER — APPOINTMENT (OUTPATIENT)
Dept: HEMATOLOGY ONCOLOGY | Facility: CLINIC | Age: 48
End: 2023-09-06
Payer: COMMERCIAL

## 2023-09-06 DIAGNOSIS — Z01.818 ENCOUNTER FOR OTHER PREPROCEDURAL EXAMINATION: ICD-10-CM

## 2023-09-06 DIAGNOSIS — D69.9 HEMORRHAGIC CONDITION, UNSPECIFIED: ICD-10-CM

## 2023-09-06 PROCEDURE — 99215 OFFICE O/P EST HI 40 MIN: CPT | Mod: 25

## 2023-09-06 RX ORDER — TRANEXAMIC ACID 650 MG/1
650 TABLET ORAL
Qty: 30 | Refills: 0 | Status: ACTIVE | COMMUNITY
Start: 2023-09-06 | End: 1900-01-01

## 2023-09-06 NOTE — REVIEW OF SYSTEMS
[Night Sweats] : night sweats [Palpitations] : palpitations [Abdominal Pain] : abdominal pain [Dysmenorrhea/Abn Vaginal Bleeding] : dysmenorrhea/abnormal vaginal bleeding [Insomnia] : insomnia [Hot Flashes] : hot flashes [Easy Bleeding] : a tendency for easy bleeding [Easy Bruising] : a tendency for easy bruising [Negative] : Neurological [FreeTextEntry5] : several times a week either at rest or on exertion [FreeTextEntry7] : since surgery in July; takes tramadol and advil [FreeTextEntry8] : states hematuria since surgery in July [FreeTextEntry2] : Review of systems negative except as outlined in HPI

## 2023-09-06 NOTE — HISTORY OF PRESENT ILLNESS
[de-identified] : Pt states her gyn, Dr. Dulce Maria Leal, referred her to Dr. Santy Delong, where comb biopsy performed  and bleeding still continues. States MD Santy Delong performed procedure to stop the bleeding 8/15/23 at [facility] and that did not work. 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 Has seen multiple mds and have had multiple tests and results are questionable.  States when having her last child in 2014, she hemorrhaged and almost needed a blood transfusion. States had several leep procedures performed with the same result of increased bleeding.  Menarche: 15 years old Menopause: perimenopause started within the last year G5,P2 (2 miscarriages and 1 ) Age at first live birth: 39 years old Nursed: formula fed Oral Contraceptives: none Hormone Replacement Therapy: none Last pap/pelvic exam: 2023 Related family history:  Paternal grandmother: breast cancer,  at age 79 Maternal grandmother: breast cancer,  at age 2022 Occupation:  Genetic testing: unsure Smoking: never ETOH: 2-3 times a week  Colonoscopy: last performed ; states test were normal Endoscopy: last performed ; states test show gastritis Mammo/US: last performed ; states test was normal Gyn: last visited 2023  DVTs: no hx Anticoagulant Hx: no hx Sleep Apnea: no hx  Recent Hospitalizations: 23 due to bleeding and was in the ER at [facility] Vaccinations: up to date  [de-identified] : Patient is seen today for follow up Vaginal bleeding resolved Scheduled for surgery with Dr Linares 9/18/23

## 2023-09-06 NOTE — ASSESSMENT
[FreeTextEntry1] : Excessive bleeding Had cone biopsy 7/31/23 for CIN3 ongoing bleeding since. Occasionally passes clots. Reports it is getting worse Had LEEP in 2020 and had excess bleeding x 2 weeks.  No prior blood transfusion but was close to getting one during 1st pregnancy which was vaginal Hamstring surgery - no bleeding at that time Colposcopy, colonoscopy- no bleeding Played soccer, volleyball, basketball, tennis, track- would bleed very easily Never had heavy menses.  No nose bleeds, gum bleeds No family h/o bleeding bruising Drinks alcohol daily-> moreso lately due to her social issues Had seen hematologist in the past after her 2nd miscarriage (both in first trimester), also has 2 FTDs. Extensive blood work reviewed analyzed and discussed  She had low fibrinogen (30s), von Willebrand antigen was 42% Repeat testing x2 shows normal fibrinogen and negative von Willebrand panel She is blood group O which can sometimes cause slightly low von Willebrand antigen Platelet surface glycoprotein panel reviewed.  Her prior history and the pattern of bleeding is not suggestive of any platelet function disorder At this time I would monitor her for any abnormal bleeding.  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  Referred to genetics.  She wants to hold off for now  Preop clearance Needs hysterectomy She is cleared for the planned procedure from hematology standpoint.   Recommend tranexamic acid 1300 mg 3 times daily x5 days starting the night of the surgery I would avoid pharmacological DVT prophylaxis and use sequential compression device until mobile  Patient had multiple questions which were answered to satisfaction  Advised patient to monitor labs with the care team including GYN, PCP. Parameters including symptoms, signs and labs to monitor discussed at length. Advised to follow up PRN, Contact information given. Patient agrees with complete understanding.

## 2023-09-06 NOTE — RESULTS/DATA
[FreeTextEntry1] : Labs reviewed, analyzed and discussed Coags normal Factor XIII normal Von Willebrand panel normal Fibrinogen normal ANCA panel normal Platelet surface glycoprotein: Marginally decreased platelet surface receptor glycoprotein VI (GPVI) is of uncertain clinical significance. This finding could be a laboratory artifact due to suboptimal sample condition, a benign polymorphism or a variant of platelet collagen receptor GPVI deficiency.

## 2023-09-08 RX ORDER — ALPRAZOLAM 0.5 MG/1
0.5 TABLET ORAL
Qty: 21 | Refills: 0 | Status: ACTIVE | COMMUNITY
Start: 2023-08-11 | End: 1900-01-01

## 2023-09-18 ENCOUNTER — APPOINTMENT (OUTPATIENT)
Dept: GYNECOLOGIC ONCOLOGY | Facility: HOSPITAL | Age: 48
End: 2023-09-18

## 2023-09-18 ENCOUNTER — RESULT REVIEW (OUTPATIENT)
Age: 48
End: 2023-09-18

## 2023-09-18 ENCOUNTER — TRANSCRIPTION ENCOUNTER (OUTPATIENT)
Age: 48
End: 2023-09-18

## 2023-09-27 DIAGNOSIS — R30.0 DYSURIA: ICD-10-CM

## 2023-10-13 ENCOUNTER — APPOINTMENT (OUTPATIENT)
Dept: GYNECOLOGIC ONCOLOGY | Facility: CLINIC | Age: 48
End: 2023-10-13
Payer: COMMERCIAL

## 2023-10-13 VITALS — DIASTOLIC BLOOD PRESSURE: 98 MMHG | HEART RATE: 81 BPM | SYSTOLIC BLOOD PRESSURE: 136 MMHG | OXYGEN SATURATION: 100 %

## 2023-10-13 DIAGNOSIS — G89.18 OTHER ACUTE POSTPROCEDURAL PAIN: ICD-10-CM

## 2023-10-13 PROCEDURE — 99024 POSTOP FOLLOW-UP VISIT: CPT

## 2023-11-03 ENCOUNTER — APPOINTMENT (OUTPATIENT)
Dept: GYNECOLOGIC ONCOLOGY | Facility: CLINIC | Age: 48
End: 2023-11-03
Payer: COMMERCIAL

## 2023-11-03 VITALS — HEART RATE: 92 BPM | SYSTOLIC BLOOD PRESSURE: 120 MMHG | DIASTOLIC BLOOD PRESSURE: 79 MMHG | OXYGEN SATURATION: 99 %

## 2023-11-03 PROCEDURE — 99213 OFFICE O/P EST LOW 20 MIN: CPT | Mod: 24

## 2023-12-01 ENCOUNTER — APPOINTMENT (OUTPATIENT)
Dept: GYNECOLOGIC ONCOLOGY | Facility: CLINIC | Age: 48
End: 2023-12-01
Payer: SELF-PAY

## 2023-12-01 VITALS — HEART RATE: 79 BPM | OXYGEN SATURATION: 79 % | SYSTOLIC BLOOD PRESSURE: 118 MMHG | DIASTOLIC BLOOD PRESSURE: 82 MMHG

## 2023-12-01 PROCEDURE — 99213 OFFICE O/P EST LOW 20 MIN: CPT | Mod: 24

## 2024-01-10 ENCOUNTER — APPOINTMENT (OUTPATIENT)
Dept: GYNECOLOGIC ONCOLOGY | Facility: CLINIC | Age: 49
End: 2024-01-10
Payer: COMMERCIAL

## 2024-01-10 PROCEDURE — 90471 IMMUNIZATION ADMIN: CPT

## 2024-01-10 PROCEDURE — 90651 9VHPV VACCINE 2/3 DOSE IM: CPT

## 2024-03-20 ENCOUNTER — APPOINTMENT (OUTPATIENT)
Dept: GYNECOLOGIC ONCOLOGY | Facility: CLINIC | Age: 49
End: 2024-03-20
Payer: COMMERCIAL

## 2024-03-20 ENCOUNTER — LABORATORY RESULT (OUTPATIENT)
Age: 49
End: 2024-03-20

## 2024-03-20 VITALS — SYSTOLIC BLOOD PRESSURE: 128 MMHG | HEART RATE: 109 BPM | DIASTOLIC BLOOD PRESSURE: 92 MMHG | OXYGEN SATURATION: 97 %

## 2024-03-20 DIAGNOSIS — B97.7 PAPILLOMAVIRUS AS THE CAUSE OF DISEASES CLASSIFIED ELSEWHERE: ICD-10-CM

## 2024-03-20 DIAGNOSIS — Z11.3 ENCOUNTER FOR SCREENING FOR INFECTIONS WITH A PREDOMINANTLY SEXUAL MODE OF TRANSMISSION: ICD-10-CM

## 2024-03-20 DIAGNOSIS — D06.9 CARCINOMA IN SITU OF CERVIX, UNSPECIFIED: ICD-10-CM

## 2024-03-20 DIAGNOSIS — N87.0 MILD CERVICAL DYSPLASIA: ICD-10-CM

## 2024-03-20 PROCEDURE — 99213 OFFICE O/P EST LOW 20 MIN: CPT

## 2024-03-20 NOTE — DISCUSSION/SUMMARY
[Reviewed Clinical Lab Test(s)] : Results of clinical tests were reviewed. [Discuss Alternatives/Risks/Benefits w/Patient] : All alternatives, risks, and benefits were discussed with the patient/family and all questions were answered.  Patient expressed good understanding and appreciates the importance of follow up as recommended. [Visit Time ___ Minutes] : [unfilled] minutes [Face to Face Time___ Minutes] : with [unfilled] minutes in face to face consultation. [FreeTextEntry1] : 49yo with CIN3/HPV 16+ s/p EUA/CKC/ECC on 7/31/23, final path CIN1. Patient returned to OR for cauterization of cervix on 8/15/23, she is now s/p EUA, robo TLH/BSO on 9/18/23, final path benign. Overall healing well. Exam today without concern for vaginal infection or STI. will f/u tests -more than 50% of visit spent face to face with patient counseling and coordinating care  -reviewed plan to continue HPV vaccine given hx of high risk HPV and CIN1 / CIN3. will plan to complete vaccine series and plan for pap 1yr post-procedure (9/2024) -pt encouraged to go slow with new partners and not rush into sexual relationships/ risky sexual behavior -STI vaginal swab obtained. will call with results -Ua/Ucx sent , will call with results -rtc in 4months for HPV dose #3 -rtc 9/2024 for pap/hpv test -pain/fever/bleeding precautions given

## 2024-03-20 NOTE — HISTORY OF PRESENT ILLNESS
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 Patient returned to OR for cauterization of cervix on 8/15/23, she is now s/p EUA, robo TLH/BSO on 9/18/23  Since last visit patient reports doing well. she denies pain/fever/bleeding/bloating. She has normal activity tolerance and normal appetite. Reports normal urination and BMs. She reports persistent vaginal discharge without odor/pain/itching/bleeding. she is concerned since she has had 2 new sexual partners recently. she requests STI testing. She continues to work on divorce process and is considering establishing care with a new therapist as this has been stressful. She has good emotional support with family and friends.   1/10/24- received 1st Gardasil injection. 3/20/24 - HPV vaccine inj #2

## 2024-03-21 LAB
APPEARANCE: CLEAR
BILIRUBIN URINE: NEGATIVE
BLOOD URINE: NEGATIVE
COLOR: YELLOW
GLUCOSE QUALITATIVE U: NEGATIVE MG/DL
KETONES URINE: NEGATIVE MG/DL
LEUKOCYTE ESTERASE URINE: NEGATIVE
NITRITE URINE: NEGATIVE
PH URINE: 6.5
PROTEIN URINE: NEGATIVE MG/DL
SPECIFIC GRAVITY URINE: <1.005
UROBILINOGEN URINE: 0.2 MG/DL

## 2024-03-25 ENCOUNTER — NON-APPOINTMENT (OUTPATIENT)
Age: 49
End: 2024-03-25

## 2024-04-26 ENCOUNTER — MED ADMIN CHARGE (OUTPATIENT)
Age: 49
End: 2024-04-26

## 2024-07-16 ENCOUNTER — APPOINTMENT (OUTPATIENT)
Dept: GYNECOLOGIC ONCOLOGY | Facility: CLINIC | Age: 49
End: 2024-07-16

## 2024-07-31 ENCOUNTER — APPOINTMENT (OUTPATIENT)
Dept: GYNECOLOGIC ONCOLOGY | Facility: CLINIC | Age: 49
End: 2024-07-31

## 2024-07-31 DIAGNOSIS — Z23 ENCOUNTER FOR IMMUNIZATION: ICD-10-CM

## 2024-07-31 DIAGNOSIS — D06.9 CARCINOMA IN SITU OF CERVIX, UNSPECIFIED: ICD-10-CM

## 2024-07-31 DIAGNOSIS — B97.7 PAPILLOMAVIRUS AS THE CAUSE OF DISEASES CLASSIFIED ELSEWHERE: ICD-10-CM

## 2024-07-31 PROCEDURE — 90471 IMMUNIZATION ADMIN: CPT

## 2024-07-31 PROCEDURE — 90651 9VHPV VACCINE 2/3 DOSE IM: CPT

## 2024-09-20 ENCOUNTER — APPOINTMENT (OUTPATIENT)
Dept: GYNECOLOGIC ONCOLOGY | Facility: CLINIC | Age: 49
End: 2024-09-20
Payer: COMMERCIAL

## 2024-09-20 VITALS — HEART RATE: 87 BPM | OXYGEN SATURATION: 94 % | SYSTOLIC BLOOD PRESSURE: 150 MMHG | DIASTOLIC BLOOD PRESSURE: 114 MMHG

## 2024-09-20 DIAGNOSIS — D06.9 CARCINOMA IN SITU OF CERVIX, UNSPECIFIED: ICD-10-CM

## 2024-09-20 DIAGNOSIS — B97.7 PAPILLOMAVIRUS AS THE CAUSE OF DISEASES CLASSIFIED ELSEWHERE: ICD-10-CM

## 2024-09-20 PROCEDURE — 99214 OFFICE O/P EST MOD 30 MIN: CPT

## 2024-09-20 PROCEDURE — 99459 PELVIC EXAMINATION: CPT

## 2024-09-20 NOTE — DISCUSSION/SUMMARY
[Reviewed Clinical Lab Test(s)] : Results of clinical tests were reviewed. [Discuss Alternatives/Risks/Benefits w/Patient] : All alternatives, risks, and benefits were discussed with the patient/family and all questions were answered.  Patient expressed good understanding and appreciates the importance of follow up as recommended. [Visit Time ___ Minutes] : [unfilled] minutes [Face to Face Time___ Minutes] : with [unfilled] minutes in face to face consultation. 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 Patient returned to OR for cauterization of cervix on 8/15/23, she is now s/p EUA, robo TLH/BSO on 9/18/23, final path benign. s/p HPV vaccine 7/2024. Annual Pap/hpv testing today -more than 50% of visit spent face to face with patient counseling and coordinating care  -reviewed HPV related genital dysplasia and no more risk for cervical dysplasia. reviewed low risk for vaginal dysplasia given prior CIN3. pt for annual pap/hpv testing today. will call with results -likely resume care with general GYN pending pap/hpv testing today -continue health maintenance with PCP -pain/fever/bleeding precautions given

## 2024-09-20 NOTE — HISTORY OF PRESENT ILLNESS
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 Patient returned to OR for cauterization of cervix on 8/15/23, she is now s/p EUA, robo TLH/BSO on 9/18/23  Since last visit patient reports doing well. she denies pain/fever/bleeding/bloating. She has normal activity tolerance and normal appetite. Reports normal urination and BMs. She is socially in a better place but still has issues with her ex- and divorce proceedings. she is up to date with PCP and health maintenance. She is s/p HPV vaccine series 7/2024. Pt for annual pap/hpv test today

## 2024-09-20 NOTE — HISTORY OF PRESENT ILLNESS
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 Patient returned to OR for cauterization of cervix on 8/15/23, she is now s/p EUA, robo TLH/BSO on 9/18/23  Since last visit patient reports doing well. she denies pain/fever/bleeding/bloating. She has normal activity tolerance and normal appetite. Reports normal urination and BMs. She is socially in a better place but still has issues with her ex- and divorce proceedings. she is up to date with PCP and health maintenance. She is s/p HPV vaccine series 7/2024. Pt for annual pap/hpv test today

## 2024-09-20 NOTE — DISCUSSION/SUMMARY
[Reviewed Clinical Lab Test(s)] : Results of clinical tests were reviewed. [Discuss Alternatives/Risks/Benefits w/Patient] : All alternatives, risks, and benefits were discussed with the patient/family and all questions were answered.  Patient expressed good understanding and appreciates the importance of follow up as recommended. [Visit Time ___ Minutes] : [unfilled] minutes [Face to Face Time___ Minutes] : with [unfilled] minutes in face to face consultation. 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 Patient returned to OR for cauterization of cervix on 8/15/23, she is now s/p EUA, robo TLH/BSO on 9/18/23, final path benign. s/p HPV vaccine 7/2024. Annual Pap/hpv testing today -more than 50% of visit spent face to face with patient counseling and coordinating care  -reviewed HPV related genital dysplasia and no more risk for cervical dysplasia. reviewed low risk for vaginal dysplasia given prior CIN3. pt for annual pap/hpv testing today. will call with results -likely resume care with general GYN pending pap/hpv testing today -continue health maintenance with PCP -pain/fever/bleeding precautions given

## 2024-09-20 NOTE — REASON FOR VISIT
Health Maintenance Due   Topic Date Due   • Hepatitis C Screening  Never done   • COVID-19 Vaccine (3 - Booster for Moderna series) 12/18/2021   • Cervical Cancer Screen 30-64 -  01/08/2023       Patient is due for topics listed above, she wishes to proceed with Cervical cancer screening, but is not proceeding with Immunization(s) COVID-19 and Hepatitis C Screening at this time. The following has occurred: Orders placed for Cervical cancer screening.   [FreeTextEntry1] : follow up, PAP

## 2024-09-23 LAB
HPV 16 E6+E7 MRNA CVX QL NAA+PROBE: NOT DETECTED
HPV HIGH+LOW RISK DNA PNL CVX: DETECTED
HPV18+45 E6+E7 MRNA CVX QL NAA+PROBE: NOT DETECTED

## 2024-09-27 LAB — CYTOLOGY CVX/VAG DOC THIN PREP: ABNORMAL

## 2024-10-01 ENCOUNTER — NON-APPOINTMENT (OUTPATIENT)
Age: 49
End: 2024-10-01

## 2024-10-01 DIAGNOSIS — B96.89 ACUTE VAGINITIS: ICD-10-CM

## 2024-10-01 DIAGNOSIS — N76.0 ACUTE VAGINITIS: ICD-10-CM

## 2024-10-01 RX ORDER — FLUCONAZOLE 150 MG/1
150 TABLET ORAL
Qty: 1 | Refills: 0 | Status: ACTIVE | COMMUNITY
Start: 2024-10-01 | End: 1900-01-01

## 2024-10-01 RX ORDER — METRONIDAZOLE 7.5 MG/G
0.75 GEL VAGINAL
Qty: 1 | Refills: 0 | Status: ACTIVE | COMMUNITY
Start: 2024-10-01 | End: 1900-01-01
